# Patient Record
Sex: FEMALE | Race: WHITE | Employment: UNEMPLOYED | URBAN - METROPOLITAN AREA
[De-identification: names, ages, dates, MRNs, and addresses within clinical notes are randomized per-mention and may not be internally consistent; named-entity substitution may affect disease eponyms.]

---

## 2020-09-21 ENCOUNTER — HOSPITAL ENCOUNTER (EMERGENCY)
Age: 50
Discharge: HOME | End: 2020-09-21
Attending: EMERGENCY MEDICINE
Payer: COMMERCIAL

## 2020-09-21 VITALS
HEART RATE: 92 BPM | DIASTOLIC BLOOD PRESSURE: 88 MMHG | TEMPERATURE: 98.4 F | SYSTOLIC BLOOD PRESSURE: 177 MMHG | OXYGEN SATURATION: 98 % | HEIGHT: 59 IN | BODY MASS INDEX: 49.39 KG/M2 | WEIGHT: 245 LBS | RESPIRATION RATE: 22 BRPM

## 2020-09-21 PROCEDURE — 99282 EMERGENCY DEPT VISIT SF MDM: CPT

## 2020-09-21 PROCEDURE — 6360000002 HC RX W HCPCS: Performed by: EMERGENCY MEDICINE

## 2020-09-21 PROCEDURE — 96372 THER/PROPH/DIAG INJ SC/IM: CPT

## 2020-09-21 RX ORDER — MORPHINE SULFATE 4 MG/ML
4 INJECTION, SOLUTION INTRAMUSCULAR; INTRAVENOUS ONCE
Status: COMPLETED | OUTPATIENT
Start: 2020-09-21 | End: 2020-09-21

## 2020-09-21 RX ORDER — QUETIAPINE FUMARATE 50 MG/1
150 TABLET, FILM COATED ORAL DAILY
COMMUNITY

## 2020-09-21 RX ORDER — ESCITALOPRAM OXALATE 20 MG/1
1 TABLET ORAL DAILY
COMMUNITY

## 2020-09-21 RX ORDER — PROPRANOLOL HYDROCHLORIDE 10 MG/1
1 TABLET ORAL DAILY
Status: ON HOLD | COMMUNITY
End: 2021-03-24 | Stop reason: HOSPADM

## 2020-09-21 RX ORDER — ORPHENADRINE CITRATE 100 MG/1
100 TABLET, EXTENDED RELEASE ORAL 2 TIMES DAILY
Qty: 20 TABLET | Refills: 0 | Status: SHIPPED | OUTPATIENT
Start: 2020-09-21 | End: 2020-10-01

## 2020-09-21 RX ADMIN — MORPHINE SULFATE 4 MG: 4 INJECTION, SOLUTION INTRAMUSCULAR; INTRAVENOUS at 15:26

## 2020-09-21 ASSESSMENT — PAIN DESCRIPTION - LOCATION: LOCATION: BACK

## 2020-09-21 ASSESSMENT — PAIN SCALES - GENERAL
PAINLEVEL_OUTOF10: 9
PAINLEVEL_OUTOF10: 9

## 2020-09-21 ASSESSMENT — PAIN DESCRIPTION - ORIENTATION: ORIENTATION: LEFT

## 2020-09-21 NOTE — ED PROVIDER NOTES
[address] [facility]  [facility]      Pt Name: Marti Reynolds  MRN: [id number]  [place] 1970  Date ofevaluation: 2020  Provider: Anupama Mcclain MD    CHIEF COMPLAINT       Chief Complaint   Patient presents with    Back Pain       HPI    HISTORY OF PRESENT ILLNESS   (Location/Symptom, Timing/Onset,Context/Setting, Quality, Duration, Modifying Factors, Severity)  Note limiting factors. Marti Reynolds is a 52 y.o. female who presents to the emergency department with back pain. This is a 59-year-old female who states that she tripped walking up some steps several days ago injuring her back. She did not have any blunt injury to the back. The patient states this is consistent with her history of back pain. She denies any numbness or paresthesias. She points to her left lower lumbar area region. NursingNotes were reviewed. Review of Systems    REVIEW OF SYSTEMS    (2-9 systems for level 4, 10 or more for level 5)     Review of Systems   Constitutional: Negative for fever. HENT: Negative for rhinorrhea and sore throat. Eyes: Negative for redness. Respiratory: Negative for shortness of breath. Cardiovascular: Negative for chest pain. Gastrointestinal: Negative for abdominal pain. Genitourinary: Negative for flank pain. Neurological: Negative for headaches. Hematological: Negative for adenopathy. Psychiatric/Behavioral: Negative for confusion. Except as noted above the remainder of the review of systems was reviewed and negative.        PAST MEDICAL HISTORY     Past Medical History:   Diagnosis Date    Anxiety     COPD (chronic obstructive pulmonary disease) ([facility] [address]     Degenerative disk disease     Depression     High blood pressure     MRSA (methicillin resistant staph aureus) culture positive          SURGICALHISTORY       Past Surgical History:   Procedure Laterality Date     SECTION      FRACTURE SURGERY rods and plate in left foot    TUBAL LIGATION           CURRENT MEDICATIONS       Previous Medications    ESCITALOPRAM OXALATE (LEXAPRO PO)    Take by mouth    LIDOCAINE (LIDODERM) 5 %    Place 1 patch onto the skin daily 12 hours on, 12 hours off. PROPRANOLOL HCL PO    Take by mouth    QUETIAPINE FUMARATE (SEROQUEL PO)    Take by mouth       ALLERGIES     Cephalexin; Codeine; and Vancomycin    FAMILY HISTORY       Family History   Problem Relation Age of Onset    Cancer Father           SOCIAL HISTORY       Social History     Socioeconomic History    Marital status:       Spouse name: None    Number of children: None    Years of education: None    Highest education level: None   Occupational History    None   Social Needs    Financial resource strain: None    Food insecurity     Worry: None     Inability: None    Transportation needs     Medical: None     Non-medical: None   Tobacco Use    Smoking status: Current Every Day Smoker     Packs/day: 0.25     Types: Cigarettes    Smokeless tobacco: Never Used   Substance and Sexual Activity    Alcohol use: No    Drug use: No    Sexual activity: Yes     Partners: Male   Lifestyle    Physical activity     Days per week: None     Minutes per session: None    Stress: None   Relationships    Social connections     Talks on phone: None     Gets together: None     Attends Anabaptist service: None     Active member of club or organization: None     Attends meetings of clubs or organizations: None     Relationship status: None    Intimate partner violence     Fear of current or ex partner: None     Emotionally abused: None     Physically abused: None     Forced sexual activity: None   Other Topics Concern    None   Social History Narrative    None       SCREENINGS             PHYSICAL EXAM    (up to 7 for level 4, 8 or more for level 5)     ED Triage Vitals [09/21/20 1442]   BP Temp Temp Source Pulse Resp SpO2 Height Weight   (!) 177/88 98.4 °F (36.9 °C) Oral 92 22 98 % 4' 11\" (1.499 m) 245 lb (111.1 kg)       Physical Exam:      General Appearance:  Alert, cooperative, appears stated age. Head:  Normocephalic, without obvious abnormality, atraumatic. Eyes:  conjunctiva/corneas clear, EOM's intact. Sclera anicteric. ENT:  Mucous remains moist and pink   Neck: Supple, symmetrical, trachea midline, no adenopathy. No jugular venous distention. Lungs:    Clear to auscultation bilaterally   Chest Wall:     Heart:   Genitourinary:    Abdomen:      Extremities:  Unremarkable. No clubbing cyanosis or edema. Pulses:  Good throughout   Skin:  No rashes or lesions to exposed skin. Neurologic: Alert and oriented X 3. Negative leg raises bilaterally. The patient did have some pain in her left lower back sciatic area. She ambulated without difficulty. There is no numbness or paresthesias. DIAGNOSTIC RESULTS         RADIOLOGY:   Non-plain filmimages such as CT, Ultrasound and MRI are read by the radiologist. Plain radiographic images are visualized and preliminarily interpreted by the emergency physician with the below findings:    See below    Interpretation per the Radiologist below, if available at the time ofthis note: All incidental findings were discussed with the patient. No orders to display         ED BEDSIDE ULTRASOUND:   Performed by ED Physician - none    LABS:  Labs Reviewed - No data to display    All other labs were within normal range or not returned as of this dictation. EMERGENCY DEPARTMENT COURSE and DIFFERENTIAL DIAGNOSIS/MDM:   Vitals:    Vitals:    09/21/20 1442   BP: (!) 177/88   Pulse: 92   Resp: 22   Temp: 98.4 °F (36.9 °C)   TempSrc: Oral   SpO2: 98%   Weight: 245 lb (111.1 kg)   Height: 4' 11\" (1.499 m)           MDM    The patient has remained stable throughout her hospital course. This is consistent with her chronic back pain.   The patient was given IM dose of morphine here and discharged with some back pain instructions and Norflex and referral back to a primary care physician. She is to return if worse or for any other emergencies. REASSESSMENT              CONSULTS:  None    PROCEDURES:  Unless otherwise noted below, none     Procedures    FINAL IMPRESSION      1. Strain of lumbar region, initial encounter          DISPOSITION/PLAN   DISPOSITION Decision To Discharge 09/21/2020 03:22:50 PM      PATIENT REFERREDTO:  [facility]) Pre-Services  [phone]  Call in 1 week  As needed      DISCHARGEMEDICATIONS:  New Prescriptions    ORPHENADRINE (NORFLEX) 100 MG EXTENDED RELEASE TABLET    Take 1 tablet by mouth 2 times daily for 10 days     Controlled Substances Monitoring:     RX Monitoring 3/10/2017   Attestation The Prescription Monitoring Report for this patient was reviewed today. Periodic Controlled Substance Monitoring No signs of potential drug abuse or diversion identified.        (Please note that portions of this note were completed with a voice recognition program.  Efforts were made to edit the dictations but occasionally words are mis-transcribed.)    Barbara Morgan MD (electronically signed)  Attending Emergency Physician          Barbara Morgan MD  09/21/20 6028

## 2021-03-03 ENCOUNTER — APPOINTMENT (OUTPATIENT)
Dept: GENERAL RADIOLOGY | Age: 51
DRG: 190 | End: 2021-03-03
Payer: COMMERCIAL

## 2021-03-03 ENCOUNTER — HOSPITAL ENCOUNTER (INPATIENT)
Age: 51
LOS: 2 days | Discharge: HOME | DRG: 190 | End: 2021-03-05
Attending: EMERGENCY MEDICINE | Admitting: INTERNAL MEDICINE
Payer: COMMERCIAL

## 2021-03-03 DIAGNOSIS — M79.605 LEFT LEG PAIN: ICD-10-CM

## 2021-03-03 DIAGNOSIS — I21.4 NSTEMI (NON-ST ELEVATED MYOCARDIAL INFARCTION) (HCC): Primary | ICD-10-CM

## 2021-03-03 DIAGNOSIS — S46.912A LEFT SHOULDER STRAIN, INITIAL ENCOUNTER: ICD-10-CM

## 2021-03-03 PROBLEM — R06.09 DYSPNEA ON EXERTION: Status: ACTIVE | Noted: 2021-03-03

## 2021-03-03 PROBLEM — E66.01 MORBID (SEVERE) OBESITY DUE TO EXCESS CALORIES (HCC): Status: ACTIVE | Noted: 2021-03-03

## 2021-03-03 PROBLEM — Z82.49 FAMILY HISTORY OF HEART FAILURE: Status: ACTIVE | Noted: 2021-03-03

## 2021-03-03 LAB
A/G RATIO: 1.1 (ref 1.1–2.2)
ALBUMIN SERPL-MCNC: 3.7 G/DL (ref 3.4–5)
ALP BLD-CCNC: 94 U/L (ref 40–129)
ALT SERPL-CCNC: 25 U/L (ref 10–40)
ANION GAP SERPL CALCULATED.3IONS-SCNC: 9 MMOL/L (ref 3–16)
ANTI-XA UNFRAC HEPARIN: 0.09 IU/ML (ref 0.3–0.7)
APTT: 50.5 SEC (ref 24.2–36.2)
AST SERPL-CCNC: 27 U/L (ref 15–37)
BASOPHILS ABSOLUTE: 0.1 K/UL (ref 0–0.2)
BASOPHILS RELATIVE PERCENT: 1.3 %
BILIRUB SERPL-MCNC: <0.2 MG/DL (ref 0–1)
BUN BLDV-MCNC: 7 MG/DL (ref 7–20)
CALCIUM SERPL-MCNC: 9.1 MG/DL (ref 8.3–10.6)
CHLORIDE BLD-SCNC: 97 MMOL/L (ref 99–110)
CO2: 31 MMOL/L (ref 21–32)
CREAT SERPL-MCNC: 0.7 MG/DL (ref 0.6–1.1)
D DIMER: <200 NG/ML DDU (ref 0–229)
EKG ATRIAL RATE: 108 BPM
EKG DIAGNOSIS: NORMAL
EKG P AXIS: 26 DEGREES
EKG P-R INTERVAL: 156 MS
EKG Q-T INTERVAL: 348 MS
EKG QRS DURATION: 72 MS
EKG QTC CALCULATION (BAZETT): 466 MS
EKG R AXIS: 14 DEGREES
EKG T AXIS: 43 DEGREES
EKG VENTRICULAR RATE: 108 BPM
EOSINOPHILS ABSOLUTE: 0.2 K/UL (ref 0–0.6)
EOSINOPHILS RELATIVE PERCENT: 1.8 %
GFR AFRICAN AMERICAN: >60
GFR NON-AFRICAN AMERICAN: >60
GLOBULIN: 3.4 G/DL
GLUCOSE BLD-MCNC: 153 MG/DL (ref 70–99)
HCG QUALITATIVE: NEGATIVE
HCT VFR BLD CALC: 42.1 % (ref 36–48)
HCT VFR BLD CALC: 42.8 % (ref 36–48)
HEMOGLOBIN: 14 G/DL (ref 12–16)
HEMOGLOBIN: 14.1 G/DL (ref 12–16)
INR BLD: 1.03 (ref 0.86–1.14)
LYMPHOCYTES ABSOLUTE: 2.2 K/UL (ref 1–5.1)
LYMPHOCYTES RELATIVE PERCENT: 24.2 %
MCH RBC QN AUTO: 32 PG (ref 26–34)
MCH RBC QN AUTO: 32.2 PG (ref 26–34)
MCHC RBC AUTO-ENTMCNC: 33 G/DL (ref 31–36)
MCHC RBC AUTO-ENTMCNC: 33.2 G/DL (ref 31–36)
MCV RBC AUTO: 96.3 FL (ref 80–100)
MCV RBC AUTO: 97.5 FL (ref 80–100)
MONOCYTES ABSOLUTE: 0.4 K/UL (ref 0–1.3)
MONOCYTES RELATIVE PERCENT: 4.4 %
NEUTROPHILS ABSOLUTE: 6.2 K/UL (ref 1.7–7.7)
NEUTROPHILS RELATIVE PERCENT: 68.3 %
PDW BLD-RTO: 16 % (ref 12.4–15.4)
PDW BLD-RTO: 16.1 % (ref 12.4–15.4)
PLATELET # BLD: 216 K/UL (ref 135–450)
PLATELET # BLD: 225 K/UL (ref 135–450)
PLATELET SLIDE REVIEW: ADEQUATE
PMV BLD AUTO: 9.3 FL (ref 5–10.5)
PMV BLD AUTO: 9.7 FL (ref 5–10.5)
POTASSIUM REFLEX MAGNESIUM: 4.1 MMOL/L (ref 3.5–5.1)
PRO-BNP: 133 PG/ML (ref 0–124)
PROTHROMBIN TIME: 11.9 SEC (ref 10–13.2)
RBC # BLD: 4.37 M/UL (ref 4–5.2)
RBC # BLD: 4.39 M/UL (ref 4–5.2)
SODIUM BLD-SCNC: 137 MMOL/L (ref 136–145)
TOTAL PROTEIN: 7.1 G/DL (ref 6.4–8.2)
TROPONIN: 0.04 NG/ML
TROPONIN: <0.01 NG/ML
WBC # BLD: 10 K/UL (ref 4–11)
WBC # BLD: 9 K/UL (ref 4–11)

## 2021-03-03 PROCEDURE — 94640 AIRWAY INHALATION TREATMENT: CPT

## 2021-03-03 PROCEDURE — 85379 FIBRIN DEGRADATION QUANT: CPT

## 2021-03-03 PROCEDURE — 99284 EMERGENCY DEPT VISIT MOD MDM: CPT

## 2021-03-03 PROCEDURE — 6360000002 HC RX W HCPCS: Performed by: EMERGENCY MEDICINE

## 2021-03-03 PROCEDURE — 83880 ASSAY OF NATRIURETIC PEPTIDE: CPT

## 2021-03-03 PROCEDURE — 73030 X-RAY EXAM OF SHOULDER: CPT

## 2021-03-03 PROCEDURE — 2580000003 HC RX 258: Performed by: INTERNAL MEDICINE

## 2021-03-03 PROCEDURE — 94761 N-INVAS EAR/PLS OXIMETRY MLT: CPT

## 2021-03-03 PROCEDURE — 2060000000 HC ICU INTERMEDIATE R&B

## 2021-03-03 PROCEDURE — 36415 COLL VENOUS BLD VENIPUNCTURE: CPT

## 2021-03-03 PROCEDURE — 94664 DEMO&/EVAL PT USE INHALER: CPT

## 2021-03-03 PROCEDURE — 85520 HEPARIN ASSAY: CPT

## 2021-03-03 PROCEDURE — 94150 VITAL CAPACITY TEST: CPT

## 2021-03-03 PROCEDURE — 96375 TX/PRO/DX INJ NEW DRUG ADDON: CPT

## 2021-03-03 PROCEDURE — 6370000000 HC RX 637 (ALT 250 FOR IP): Performed by: INTERNAL MEDICINE

## 2021-03-03 PROCEDURE — 2700000000 HC OXYGEN THERAPY PER DAY

## 2021-03-03 PROCEDURE — 96374 THER/PROPH/DIAG INJ IV PUSH: CPT

## 2021-03-03 PROCEDURE — 1200000000 HC SEMI PRIVATE

## 2021-03-03 PROCEDURE — 85027 COMPLETE CBC AUTOMATED: CPT

## 2021-03-03 PROCEDURE — 6370000000 HC RX 637 (ALT 250 FOR IP): Performed by: EMERGENCY MEDICINE

## 2021-03-03 PROCEDURE — 85025 COMPLETE CBC W/AUTO DIFF WBC: CPT

## 2021-03-03 PROCEDURE — 84703 CHORIONIC GONADOTROPIN ASSAY: CPT

## 2021-03-03 PROCEDURE — 6360000002 HC RX W HCPCS: Performed by: INTERNAL MEDICINE

## 2021-03-03 PROCEDURE — 85610 PROTHROMBIN TIME: CPT

## 2021-03-03 PROCEDURE — 93005 ELECTROCARDIOGRAM TRACING: CPT | Performed by: EMERGENCY MEDICINE

## 2021-03-03 PROCEDURE — 84484 ASSAY OF TROPONIN QUANT: CPT

## 2021-03-03 PROCEDURE — 85730 THROMBOPLASTIN TIME PARTIAL: CPT

## 2021-03-03 PROCEDURE — 71046 X-RAY EXAM CHEST 2 VIEWS: CPT

## 2021-03-03 PROCEDURE — 93010 ELECTROCARDIOGRAM REPORT: CPT | Performed by: INTERNAL MEDICINE

## 2021-03-03 PROCEDURE — 80053 COMPREHEN METABOLIC PANEL: CPT

## 2021-03-03 RX ORDER — SODIUM CHLORIDE 0.9 % (FLUSH) 0.9 %
10 SYRINGE (ML) INJECTION EVERY 12 HOURS SCHEDULED
Status: DISCONTINUED | OUTPATIENT
Start: 2021-03-03 | End: 2021-03-05 | Stop reason: HOSPADM

## 2021-03-03 RX ORDER — PROMETHAZINE HYDROCHLORIDE 12.5 MG/1
12.5 TABLET ORAL EVERY 6 HOURS PRN
Status: DISCONTINUED | OUTPATIENT
Start: 2021-03-03 | End: 2021-03-05 | Stop reason: HOSPADM

## 2021-03-03 RX ORDER — IPRATROPIUM BROMIDE AND ALBUTEROL SULFATE 2.5; .5 MG/3ML; MG/3ML
1 SOLUTION RESPIRATORY (INHALATION) EVERY 4 HOURS
COMMUNITY

## 2021-03-03 RX ORDER — IBUPROFEN 800 MG/1
800 TABLET ORAL PRN
Status: ON HOLD | COMMUNITY
End: 2021-03-24 | Stop reason: HOSPADM

## 2021-03-03 RX ORDER — QUETIAPINE FUMARATE 300 MG/1
150 TABLET, FILM COATED ORAL DAILY
Status: DISCONTINUED | OUTPATIENT
Start: 2021-03-04 | End: 2021-03-05 | Stop reason: HOSPADM

## 2021-03-03 RX ORDER — HEPARIN SODIUM 1000 [USP'U]/ML
4000 INJECTION, SOLUTION INTRAVENOUS; SUBCUTANEOUS PRN
Status: DISCONTINUED | OUTPATIENT
Start: 2021-03-03 | End: 2021-03-04

## 2021-03-03 RX ORDER — HEPARIN SODIUM 1000 [USP'U]/ML
2000 INJECTION, SOLUTION INTRAVENOUS; SUBCUTANEOUS PRN
Status: DISCONTINUED | OUTPATIENT
Start: 2021-03-03 | End: 2021-03-03 | Stop reason: SDUPTHER

## 2021-03-03 RX ORDER — ASPIRIN 81 MG/1
81 TABLET, CHEWABLE ORAL DAILY
Status: DISCONTINUED | OUTPATIENT
Start: 2021-03-04 | End: 2021-03-05 | Stop reason: HOSPADM

## 2021-03-03 RX ORDER — HEPARIN SODIUM 1000 [USP'U]/ML
4000 INJECTION, SOLUTION INTRAVENOUS; SUBCUTANEOUS ONCE
Status: COMPLETED | OUTPATIENT
Start: 2021-03-03 | End: 2021-03-03

## 2021-03-03 RX ORDER — ESCITALOPRAM OXALATE 20 MG/1
20 TABLET ORAL DAILY
Status: DISCONTINUED | OUTPATIENT
Start: 2021-03-04 | End: 2021-03-05 | Stop reason: HOSPADM

## 2021-03-03 RX ORDER — ACETAMINOPHEN 325 MG/1
650 TABLET ORAL EVERY 6 HOURS PRN
Status: DISCONTINUED | OUTPATIENT
Start: 2021-03-03 | End: 2021-03-05 | Stop reason: HOSPADM

## 2021-03-03 RX ORDER — ATORVASTATIN CALCIUM 80 MG/1
80 TABLET, FILM COATED ORAL NIGHTLY
Status: DISCONTINUED | OUTPATIENT
Start: 2021-03-03 | End: 2021-03-05 | Stop reason: HOSPADM

## 2021-03-03 RX ORDER — ORPHENADRINE CITRATE 30 MG/ML
60 INJECTION INTRAMUSCULAR; INTRAVENOUS ONCE
Status: COMPLETED | OUTPATIENT
Start: 2021-03-03 | End: 2021-03-03

## 2021-03-03 RX ORDER — OXYCODONE HYDROCHLORIDE AND ACETAMINOPHEN 5; 325 MG/1; MG/1
1 TABLET ORAL ONCE
Status: COMPLETED | OUTPATIENT
Start: 2021-03-03 | End: 2021-03-03

## 2021-03-03 RX ORDER — IPRATROPIUM BROMIDE AND ALBUTEROL SULFATE 2.5; .5 MG/3ML; MG/3ML
1 SOLUTION RESPIRATORY (INHALATION) EVERY 4 HOURS
Status: DISCONTINUED | OUTPATIENT
Start: 2021-03-03 | End: 2021-03-03

## 2021-03-03 RX ORDER — HEPARIN SODIUM 10000 [USP'U]/100ML
11 INJECTION, SOLUTION INTRAVENOUS CONTINUOUS
Status: DISCONTINUED | OUTPATIENT
Start: 2021-03-03 | End: 2021-03-04

## 2021-03-03 RX ORDER — TRAMADOL HYDROCHLORIDE 50 MG/1
50 TABLET ORAL EVERY 6 HOURS PRN
Status: DISCONTINUED | OUTPATIENT
Start: 2021-03-03 | End: 2021-03-05 | Stop reason: HOSPADM

## 2021-03-03 RX ORDER — POLYETHYLENE GLYCOL 3350 17 G/17G
17 POWDER, FOR SOLUTION ORAL DAILY PRN
Status: DISCONTINUED | OUTPATIENT
Start: 2021-03-03 | End: 2021-03-05 | Stop reason: HOSPADM

## 2021-03-03 RX ORDER — ONDANSETRON 2 MG/ML
4 INJECTION INTRAMUSCULAR; INTRAVENOUS ONCE
Status: COMPLETED | OUTPATIENT
Start: 2021-03-03 | End: 2021-03-03

## 2021-03-03 RX ORDER — HEPARIN SODIUM 1000 [USP'U]/ML
4000 INJECTION, SOLUTION INTRAVENOUS; SUBCUTANEOUS PRN
Status: DISCONTINUED | OUTPATIENT
Start: 2021-03-03 | End: 2021-03-03 | Stop reason: SDUPTHER

## 2021-03-03 RX ORDER — ASPIRIN 81 MG/1
324 TABLET, CHEWABLE ORAL ONCE
Status: COMPLETED | OUTPATIENT
Start: 2021-03-03 | End: 2021-03-03

## 2021-03-03 RX ORDER — ACETAMINOPHEN 650 MG/1
650 SUPPOSITORY RECTAL EVERY 6 HOURS PRN
Status: DISCONTINUED | OUTPATIENT
Start: 2021-03-03 | End: 2021-03-05 | Stop reason: HOSPADM

## 2021-03-03 RX ORDER — ONDANSETRON 2 MG/ML
4 INJECTION INTRAMUSCULAR; INTRAVENOUS EVERY 6 HOURS PRN
Status: DISCONTINUED | OUTPATIENT
Start: 2021-03-03 | End: 2021-03-05 | Stop reason: HOSPADM

## 2021-03-03 RX ORDER — ALBUTEROL SULFATE 2.5 MG/3ML
2.5 SOLUTION RESPIRATORY (INHALATION)
Status: DISCONTINUED | OUTPATIENT
Start: 2021-03-04 | End: 2021-03-05 | Stop reason: HOSPADM

## 2021-03-03 RX ORDER — HEPARIN SODIUM 10000 [USP'U]/100ML
7 INJECTION, SOLUTION INTRAVENOUS CONTINUOUS
Status: DISCONTINUED | OUTPATIENT
Start: 2021-03-03 | End: 2021-03-03 | Stop reason: SDUPTHER

## 2021-03-03 RX ORDER — KETOROLAC TROMETHAMINE 30 MG/ML
30 INJECTION, SOLUTION INTRAMUSCULAR; INTRAVENOUS ONCE
Status: COMPLETED | OUTPATIENT
Start: 2021-03-03 | End: 2021-03-03

## 2021-03-03 RX ORDER — NICOTINE 21 MG/24HR
1 PATCH, TRANSDERMAL 24 HOURS TRANSDERMAL DAILY
Status: DISCONTINUED | OUTPATIENT
Start: 2021-03-04 | End: 2021-03-03

## 2021-03-03 RX ORDER — ALBUTEROL SULFATE 2.5 MG/3ML
2.5 SOLUTION RESPIRATORY (INHALATION) EVERY 6 HOURS PRN
Status: DISCONTINUED | OUTPATIENT
Start: 2021-03-03 | End: 2021-03-05 | Stop reason: HOSPADM

## 2021-03-03 RX ORDER — SODIUM CHLORIDE 0.9 % (FLUSH) 0.9 %
10 SYRINGE (ML) INJECTION PRN
Status: DISCONTINUED | OUTPATIENT
Start: 2021-03-03 | End: 2021-03-05 | Stop reason: HOSPADM

## 2021-03-03 RX ORDER — IPRATROPIUM BROMIDE AND ALBUTEROL SULFATE 2.5; .5 MG/3ML; MG/3ML
1 SOLUTION RESPIRATORY (INHALATION) EVERY 4 HOURS PRN
Status: DISCONTINUED | OUTPATIENT
Start: 2021-03-03 | End: 2021-03-05 | Stop reason: HOSPADM

## 2021-03-03 RX ORDER — HEPARIN SODIUM 1000 [USP'U]/ML
2000 INJECTION, SOLUTION INTRAVENOUS; SUBCUTANEOUS PRN
Status: DISCONTINUED | OUTPATIENT
Start: 2021-03-03 | End: 2021-03-04

## 2021-03-03 RX ORDER — NICOTINE 21 MG/24HR
1 PATCH, TRANSDERMAL 24 HOURS TRANSDERMAL DAILY
Status: DISCONTINUED | OUTPATIENT
Start: 2021-03-03 | End: 2021-03-05 | Stop reason: HOSPADM

## 2021-03-03 RX ADMIN — ONDANSETRON 4 MG: 2 INJECTION INTRAMUSCULAR; INTRAVENOUS at 17:12

## 2021-03-03 RX ADMIN — KETOROLAC TROMETHAMINE 30 MG: 30 INJECTION, SOLUTION INTRAMUSCULAR at 14:32

## 2021-03-03 RX ADMIN — HEPARIN SODIUM 7 UNITS/KG/HR: 10000 INJECTION, SOLUTION INTRAVENOUS at 16:41

## 2021-03-03 RX ADMIN — Medication 10 ML: at 20:38

## 2021-03-03 RX ADMIN — NICOTINE POLACRILEX 2 MG: 2 GUM, CHEWING BUCCAL at 17:13

## 2021-03-03 RX ADMIN — NITROGLYCERIN 0.5 INCH: 20 OINTMENT TOPICAL at 15:40

## 2021-03-03 RX ADMIN — NITROGLYCERIN 0.5 INCH: 20 OINTMENT TOPICAL at 23:48

## 2021-03-03 RX ADMIN — HEPARIN SODIUM 7 UNITS/KG/HR: 10000 INJECTION, SOLUTION INTRAVENOUS at 20:36

## 2021-03-03 RX ADMIN — ORPHENADRINE CITRATE 60 MG: 30 INJECTION INTRAMUSCULAR; INTRAVENOUS at 14:49

## 2021-03-03 RX ADMIN — TRAMADOL HYDROCHLORIDE 50 MG: 50 TABLET, FILM COATED ORAL at 22:25

## 2021-03-03 RX ADMIN — HEPARIN SODIUM 4000 UNITS: 1000 INJECTION, SOLUTION INTRAVENOUS; SUBCUTANEOUS at 23:50

## 2021-03-03 RX ADMIN — ATORVASTATIN CALCIUM 80 MG: 80 TABLET, FILM COATED ORAL at 20:36

## 2021-03-03 RX ADMIN — NITROGLYCERIN 0.5 INCH: 20 OINTMENT TOPICAL at 20:36

## 2021-03-03 RX ADMIN — IPRATROPIUM BROMIDE AND ALBUTEROL SULFATE 3 ML: .5; 3 SOLUTION RESPIRATORY (INHALATION) at 22:09

## 2021-03-03 RX ADMIN — OXYCODONE HYDROCHLORIDE AND ACETAMINOPHEN 1 TABLET: 5; 325 TABLET ORAL at 17:12

## 2021-03-03 RX ADMIN — HEPARIN SODIUM 4000 UNITS: 1000 INJECTION INTRAVENOUS; SUBCUTANEOUS at 16:41

## 2021-03-03 RX ADMIN — ASPIRIN 324 MG: 81 TABLET, CHEWABLE ORAL at 15:40

## 2021-03-03 ASSESSMENT — PAIN DESCRIPTION - ONSET
ONSET: ON-GOING
ONSET: ON-GOING

## 2021-03-03 ASSESSMENT — PAIN DESCRIPTION - DESCRIPTORS: DESCRIPTORS: ACHING

## 2021-03-03 ASSESSMENT — PAIN DESCRIPTION - LOCATION
LOCATION: ARM
LOCATION: ARM

## 2021-03-03 ASSESSMENT — PAIN DESCRIPTION - PROGRESSION: CLINICAL_PROGRESSION: NOT CHANGED

## 2021-03-03 ASSESSMENT — PAIN DESCRIPTION - ORIENTATION: ORIENTATION: LEFT

## 2021-03-03 ASSESSMENT — PAIN SCALES - GENERAL
PAINLEVEL_OUTOF10: 8
PAINLEVEL_OUTOF10: 6

## 2021-03-03 ASSESSMENT — PAIN DESCRIPTION - FREQUENCY: FREQUENCY: CONTINUOUS

## 2021-03-03 NOTE — ED NOTES
Report to [employer]. Patient alert and oriented at time of transport. Agreeable to admission. Taken on cardiac monitor with heparin infusing on IV pump.      Dea Nava RN  03/03/21 1346

## 2021-03-03 NOTE — ED PROVIDER NOTES
[facility] [place], [place]  EMERGENCY DEPT VISIT      Patient Identification  Lashawn Sutton is a 52 y.o. female. Chief Complaint   Shortness of Breath and Arm Pain      History of Present Illness: This is a  52 y.o. female who presents ambulatory  to the ED with complaints of left arm and shoulder pain and left leg pain. Patient states that she had a fall 3 or 4 weeks ago. At the time of the fall she primarily states that her left foot hurt but was still able to walk on it. About a week later she started having pain in her left shoulder and pectoral region which radiates down into the elbow and forearm with movement of the arm. This is gotten progressively worse to the point where she cannot even pull up her pants with this arm. She has been taking ibuprofen and a few Robaxin tablets without relief. She also states that her left leg has been painful for the last 3 weeks again starting several days after the fall. Most of her pain is in the back of the knee and calf. She has noticed increased swelling in left greater than right leg. She states that she does live a relatively sedentary lifestyle however she has been up and ambulating on the leg ever since the fall. She has no history of DVT or PE in the past.  Patient does have history of COPD and states that her shortness of breath has been getting progressively worse over several months. She has put on a fair amount of weight as well. She saw her PCP 2 months ago and was started on Spiriva but has noticed only minimal improvement. She was noted to be quite short of breath when she arrived in the emergency department but she tells me that this is fairly typical for her when she walks any significant distance. She denies any fever. No sinus congestion, sore throat, cough. She will occasionally get some tightness in her chest. IT lasts 15-60 minutes and has been going on for a few months. It may be getting more prolonged. Last episode earlier today.  She is not syncope  SKIN: no rashes, no erythema, no wounds, no ecchymosis      PHYSICAL EXAM:  GENERAL APPEARANCE: Arcadio Martin is in no acute respiratory distress. Awake and alert. VITAL SIGNS:   ED Triage Vitals [03/03/21 1336]   Enc Vitals Group      BP (!) 174/84      Pulse 118      Resp 18      Temp 98.5 °F (36.9 °C)      Temp Source Oral      SpO2 95 %      Weight (!) 303 lb 12.8 oz (137.8 kg)      Height       Head Circumference       Peak Flow       Pain Score       Pain Loc       Pain Edu? Excl. in [address]? HEAD: Normocephalic, atraumatic. EYES:  Extraocular muscles are intact. Pupils equal round and reactive to light. Conjunctivas are pink. Negative scleral icterus. ENT:  Mucous membranes are moist.  Pharynx without erythema or exudates. NECK: Nontender and supple. No cervical adenopathy. CHEST:  Clear to auscultation bilaterally. No rales, rhonchi, or wheezing. Diminished breath sounds  HEART:  Mildly tachycardic rate and regular rhythm. No murmurs. Strong and equal pulses in the upper and lower extremities. ABDOMEN: Soft,  nondistended, positive bowel sounds. abdomen is nontender. No rebound. no guarding. MUSCULOSKELETAL: The calves are nontender to palpation. Active range of motion of the upper and lower extremities. No edema. Left shoulder is diffuselly tender with decreased ROM due to pain. Left pectoral and posterior shoulder tender. Left calf and popliteal region tender. No bruising or erythema. Strong pedal pulses. NEUROLOGICAL: Awake, alert and oriented x 3. Power intact in the upper and lower extremities. Sensation is intact to light touch in the upper and lower extremities. Cranial Nerves 2-12 are intact. DERMATOLOGIC: No petechiae, rashes, or ecchymoses. No erythema. PSYCH: normal mood and affect. Normal thought content.       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 Procedure: 2D Echo with Doppler and color flow (36563). The exam was  diagnostic. Left Ventricle: The left ventricle is normal in size. There is normal  left ventricular wall thickness. The left ventricular wall motion is  normal. No left ventricular thrombus detected. Right Ventricle: The right ventricle is normal size. There is normal  right ventricular wall thickness. The right ventricular systolic  function is normal.    Left Atrium: The left atrial size is normal.    Right Atrium: Right atrial size is normal.    Mitral Valve: The mitral valve leaflets appear normal. There is no  evidence of stenosis, fluttering, or prolapse. There is trace mitral  regurgitation. There is no evidence of mitral valve prolapse. There  is no mitral valve stenosis. Aortic Valve: The aortic valve is normal in structure. No aortic  regurgitation is present. No hemodynamically significant valvular  aortic stenosis. Aortic Root: The aortic root is normal size. Tricuspid Valve: The tricuspid valve is normal in structure and  function. Trace tricuspid regurgitation is present. Diastolic Function: Normal Diastolic Function. Pulmonic Valve/Pulmonary Artery: The pulmonic valve is normal in  structure. There is trace pulmonic valvular regurgitation. Pericardium: There is no pericardial effusion. There is no pleural  effusion. Signed by:Yanick Cee MD on 03/08/2019 12:54 PM  Referring Physician: Lexy Hannah Performed By: Liv Pereira RDCS      EKG as interpreted by myself:  sinus tachycardia, jxhg=[id number]   Axis is   Normal  QTc is  normal  Intervals and Durations are unremarkable. LOW VOLTAGE  Septal Q waves    No specific ST-T wave changes appreciated. No evidence of acute ischemia. No prior EKG    Radiology:  Films have been read by radiologist as noted in chart unless otherwise stated.  Other radiologic studies (i.e. CT, MRI, ultrasounds, etc ) have been interpreted by radiologist.     XR CHEST (2 VW)   Final Result      CHEST: 2 views demonstrate clear lungs. Normal cardiac mediastinal silhouette. LEFT SHOULDER: 3 views demonstrate no abnormality. XR SHOULDER LEFT (MIN 2 VIEWS)   Final Result      CHEST: 2 views demonstrate clear lungs. Normal cardiac mediastinal silhouette. LEFT SHOULDER: 3 views demonstrate no abnormality.           Labs:  Results for orders placed or performed during the hospital encounter of 03/03/21   CBC Auto Differential   Result Value Ref Range    WBC 9.0 4.0 - 11.0 K/uL    RBC 4.37 4.00 - 5.20 M/uL    Hemoglobin 14.0 12.0 - 16.0 g/dL    Hematocrit 42.1 36.0 - 48.0 %    MCV 96.3 80.0 - 100.0 fL    MCH 32.0 26.0 - 34.0 pg    MCHC 33.2 31.0 - 36.0 g/dL    RDW 16.1 (H) 12.4 - 15.4 %    Platelets 133 130 - 234 K/uL    MPV 9.7 5.0 - 10.5 fL    PLATELET SLIDE REVIEW Adequate     Neutrophils % 68.3 %    Lymphocytes % 24.2 %    Monocytes % 4.4 %    Eosinophils % 1.8 %    Basophils % 1.3 %    Neutrophils Absolute 6.2 1.7 - 7.7 K/uL    Lymphocytes Absolute 2.2 1.0 - 5.1 K/uL    Monocytes Absolute 0.4 0.0 - 1.3 K/uL    Eosinophils Absolute 0.2 0.0 - 0.6 K/uL    Basophils Absolute 0.1 0.0 - 0.2 K/uL   Comprehensive Metabolic Panel w/ Reflex to MG   Result Value Ref Range    Sodium 137 136 - 145 mmol/L    Potassium reflex Magnesium 4.1 3.5 - 5.1 mmol/L    Chloride 97 (L) 99 - 110 mmol/L    CO2 31 21 - 32 mmol/L    Anion Gap 9 3 - 16    Glucose 153 (H) 70 - 99 mg/dL    BUN 7 7 - 20 mg/dL    CREATININE 0.7 0.6 - 1.1 mg/dL    GFR Non-African American >60 >60    GFR African American >60 >60    Calcium 9.1 8.3 - 10.6 mg/dL    Total Protein 7.1 6.4 - 8.2 g/dL    Albumin 3.7 3.4 - 5.0 g/dL    Albumin/Globulin Ratio 1.1 1.1 - 2.2    Total Bilirubin <0.2 0.0 - 1.0 mg/dL    Alkaline Phosphatase 94 40 - 129 U/L    ALT 25 10 - 40 U/L    AST 27 15 - 37 U/L    Globulin 3.4 g/dL   D-Dimer, Quantitative   Result Value Ref Range    D-Dimer, Quant <200 0 - 229 ng/mL DDU   HCG Qualitative, Serum   Result Value Ref Range    hCG Qual Negative Detects HCG level >10 MIU/mL   Troponin   Result Value Ref Range    Troponin 0.04 (H) <0.01 ng/mL   Brain Natriuretic Peptide   Result Value Ref Range    Pro- (H) 0 - 124 pg/mL   Troponin   Result Value Ref Range    Troponin <0.01 <0.01 ng/mL   EKG 12 Lead   Result Value Ref Range    Ventricular Rate 108 BPM    Atrial Rate 108 BPM    P-R Interval 156 ms    QRS Duration 72 ms    Q-T Interval 348 ms    QTc Calculation (Bazett) 466 ms    P Axis 26 degrees    R Axis 14 degrees    T Axis 43 degrees    Diagnosis       Sinus tachycardiaAnteroseptal infarct , age undeterminedAbnormal ECGConfirmed by [facility] ANUJ MAGALLANES, Vel Huff ([phone]) on 3/3/2021 3:24:41 PM       Treatment in the department:  Patient received the following while in the ED.     Medications   heparin (porcine) injection 4,000 Units (has no administration in time range)   heparin (porcine) injection 2,000 Units (has no administration in time range)   heparin 25,000 units in dextrose 5% 250 mL (premix) infusion (7 Units/kg/hr × 137.8 kg Intravenous New Bag 3/3/21 1641)   nicotine polacrilex (NICORETTE) gum 2 mg (2 mg Oral Given 3/3/21 1713)   ketorolac (TORADOL) injection 30 mg (30 mg Intravenous Given 3/3/21 1432)   orphenadrine (NORFLEX) injection 60 mg (60 mg Intravenous Given 3/3/21 1449)   aspirin chewable tablet 324 mg (324 mg Oral Given 3/3/21 1540)   nitroglycerin (NITRO-BID) 2 % ointment 0.5 inch (0.5 inches Topical Given 3/3/21 1540)   heparin (porcine) injection 4,000 Units (4,000 Units Intravenous Given 3/3/21 1641)   oxyCODONE-acetaminophen (PERCOCET) 5-325 MG per tablet 1 tablet (1 tablet Oral Given 3/3/21 1712)   ondansetron (ZOFRAN) injection 4 mg (4 mg Intravenous Given 3/3/21 1712)       Medical decision making:  Patient presented with primary complaint of left shoulder pain and left leg pain but noted to be quite dyspneic on arrival. Later reported off and on chest tightness for months with this somewhat progressive sob. Leg and shoulder pain after fall. No fractures. ddimer negative so less likely to have DVT. Troponin positive but repeat normal. Has family h/o cardiomyopathy. 1620  Discussed case with dr Bess Johnson, cardiology. Agrees with admission and IV heparin    I spoke with Dr. Urvashi Glaser. We thoroughly discussed the history, physical exam, laboratory and imaging studies, as well as, emergency department course. Based upon that discussion, we've decided to [address] [place] [address] for further observation and evaluation ofNanette Guerrero's chest pain. As I have deemed necessary from their history, physical, and studies, I have considered and evaluatedNanette Guerrero for the following diagnoses:  ACUTE CORONARY SYNDROME, PERICARDIAL TAMPONADE, PNEUMOTHORAX, PULMONARY EMBOLISM, and THORACIC DISSECTION, PNEUMONIA, PERICARDITIS. Clinical Impression:  1. NSTEMI (non-ST elevated myocardial infarction) ([address]    2. Left shoulder strain, initial encounter    3. Left leg pain        Dispo:  Patient will be admitted at this time. Patient was informed of this decision and agrees with plan. I have discussed lab and xray findings with patient and they understand. Questions were answered to the best of my ability. Discharge vitals:  Blood pressure (!) 147/75, pulse 95, temperature 98.5 °F (36.9 °C), temperature source Oral, resp. rate 18, weight (!) 303 lb 12.8 oz (137.8 kg), last menstrual period 02/28/2021, SpO2 93 %. Prescriptions given:   New Prescriptions    No medications on file           This chart was created using Dragon voice recognition software.         Joyce Grace MD  03/05/21 5743

## 2021-03-03 NOTE — ED TRIAGE NOTES
Patient c/o shortness of breath since yesterday. Swelling to BLE x past 3 weeks. Also c/o left arm pain x 3 weeks which she believes is from a fall 4 weeks ago. Painful to move arm.

## 2021-03-04 ENCOUNTER — APPOINTMENT (OUTPATIENT)
Dept: GENERAL RADIOLOGY | Age: 51
DRG: 190 | End: 2021-03-04
Payer: COMMERCIAL

## 2021-03-04 LAB
ANION GAP SERPL CALCULATED.3IONS-SCNC: 7 MMOL/L (ref 3–16)
ANTI-XA UNFRAC HEPARIN: 0.29 IU/ML (ref 0.3–0.7)
ANTI-XA UNFRAC HEPARIN: 0.32 IU/ML (ref 0.3–0.7)
BUN BLDV-MCNC: 9 MG/DL (ref 7–20)
CALCIUM SERPL-MCNC: 8.8 MG/DL (ref 8.3–10.6)
CHLORIDE BLD-SCNC: 99 MMOL/L (ref 99–110)
CHOLESTEROL, TOTAL: 196 MG/DL (ref 0–199)
CO2: 30 MMOL/L (ref 21–32)
CREAT SERPL-MCNC: 0.8 MG/DL (ref 0.6–1.1)
EKG ATRIAL RATE: 94 BPM
EKG DIAGNOSIS: NORMAL
EKG P AXIS: 52 DEGREES
EKG P-R INTERVAL: 156 MS
EKG Q-T INTERVAL: 380 MS
EKG QRS DURATION: 72 MS
EKG QTC CALCULATION (BAZETT): 475 MS
EKG R AXIS: 28 DEGREES
EKG T AXIS: 63 DEGREES
EKG VENTRICULAR RATE: 94 BPM
GFR AFRICAN AMERICAN: >60
GFR NON-AFRICAN AMERICAN: >60
GLUCOSE BLD-MCNC: 148 MG/DL (ref 70–99)
HCT VFR BLD CALC: 40.5 % (ref 36–48)
HDLC SERPL-MCNC: 34 MG/DL (ref 40–60)
HEMOGLOBIN: 13.6 G/DL (ref 12–16)
LDL CHOLESTEROL CALCULATED: 109 MG/DL
LV EF: 58 %
LVEF MODALITY: NORMAL
MCH RBC QN AUTO: 32.3 PG (ref 26–34)
MCHC RBC AUTO-ENTMCNC: 33.5 G/DL (ref 31–36)
MCV RBC AUTO: 96.2 FL (ref 80–100)
PDW BLD-RTO: 16.3 % (ref 12.4–15.4)
PLATELET # BLD: 187 K/UL (ref 135–450)
PMV BLD AUTO: 9.8 FL (ref 5–10.5)
POTASSIUM REFLEX MAGNESIUM: 4.9 MMOL/L (ref 3.5–5.1)
RBC # BLD: 4.21 M/UL (ref 4–5.2)
SODIUM BLD-SCNC: 136 MMOL/L (ref 136–145)
TRIGL SERPL-MCNC: 263 MG/DL (ref 0–150)
VLDLC SERPL CALC-MCNC: 53 MG/DL
WBC # BLD: 7.9 K/UL (ref 4–11)

## 2021-03-04 PROCEDURE — 2580000003 HC RX 258: Performed by: INTERNAL MEDICINE

## 2021-03-04 PROCEDURE — 94761 N-INVAS EAR/PLS OXIMETRY MLT: CPT

## 2021-03-04 PROCEDURE — 99255 IP/OBS CONSLTJ NEW/EST HI 80: CPT | Performed by: INTERNAL MEDICINE

## 2021-03-04 PROCEDURE — 6370000000 HC RX 637 (ALT 250 FOR IP): Performed by: INTERNAL MEDICINE

## 2021-03-04 PROCEDURE — 94640 AIRWAY INHALATION TREATMENT: CPT

## 2021-03-04 PROCEDURE — 71046 X-RAY EXAM CHEST 2 VIEWS: CPT

## 2021-03-04 PROCEDURE — 6360000002 HC RX W HCPCS: Performed by: INTERNAL MEDICINE

## 2021-03-04 PROCEDURE — 85520 HEPARIN ASSAY: CPT

## 2021-03-04 PROCEDURE — 93005 ELECTROCARDIOGRAM TRACING: CPT | Performed by: INTERNAL MEDICINE

## 2021-03-04 PROCEDURE — 2060000000 HC ICU INTERMEDIATE R&B

## 2021-03-04 PROCEDURE — 2700000000 HC OXYGEN THERAPY PER DAY

## 2021-03-04 PROCEDURE — 80061 LIPID PANEL: CPT

## 2021-03-04 PROCEDURE — 83036 HEMOGLOBIN GLYCOSYLATED A1C: CPT

## 2021-03-04 PROCEDURE — 36415 COLL VENOUS BLD VENIPUNCTURE: CPT

## 2021-03-04 PROCEDURE — 93010 ELECTROCARDIOGRAM REPORT: CPT | Performed by: INTERNAL MEDICINE

## 2021-03-04 PROCEDURE — 80048 BASIC METABOLIC PNL TOTAL CA: CPT

## 2021-03-04 PROCEDURE — 93306 TTE W/DOPPLER COMPLETE: CPT

## 2021-03-04 PROCEDURE — 85027 COMPLETE CBC AUTOMATED: CPT

## 2021-03-04 RX ORDER — LISINOPRIL 5 MG/1
5 TABLET ORAL DAILY
Status: DISCONTINUED | OUTPATIENT
Start: 2021-03-04 | End: 2021-03-05 | Stop reason: HOSPADM

## 2021-03-04 RX ORDER — FUROSEMIDE 10 MG/ML
20 INJECTION INTRAMUSCULAR; INTRAVENOUS ONCE
Status: COMPLETED | OUTPATIENT
Start: 2021-03-04 | End: 2021-03-04

## 2021-03-04 RX ADMIN — FUROSEMIDE 20 MG: 10 INJECTION, SOLUTION INTRAMUSCULAR; INTRAVENOUS at 18:24

## 2021-03-04 RX ADMIN — ESCITALOPRAM OXALATE 20 MG: 20 TABLET ORAL at 08:08

## 2021-03-04 RX ADMIN — QUETIAPINE FUMARATE 150 MG: 300 TABLET ORAL at 08:08

## 2021-03-04 RX ADMIN — NITROGLYCERIN 0.5 INCH: 20 OINTMENT TOPICAL at 05:53

## 2021-03-04 RX ADMIN — ALBUTEROL SULFATE 2.5 MG: 2.5 SOLUTION RESPIRATORY (INHALATION) at 12:44

## 2021-03-04 RX ADMIN — DICLOFENAC 4 G: 10 GEL TOPICAL at 04:20

## 2021-03-04 RX ADMIN — ALBUTEROL SULFATE 2.5 MG: 2.5 SOLUTION RESPIRATORY (INHALATION) at 09:39

## 2021-03-04 RX ADMIN — LISINOPRIL 5 MG: 5 TABLET ORAL at 16:01

## 2021-03-04 RX ADMIN — ALBUTEROL SULFATE 2.5 MG: 2.5 SOLUTION RESPIRATORY (INHALATION) at 16:49

## 2021-03-04 RX ADMIN — ATORVASTATIN CALCIUM 80 MG: 80 TABLET, FILM COATED ORAL at 20:43

## 2021-03-04 RX ADMIN — TIOTROPIUM BROMIDE INHALATION SPRAY 2 PUFF: 3.12 SPRAY, METERED RESPIRATORY (INHALATION) at 09:40

## 2021-03-04 RX ADMIN — TRAMADOL HYDROCHLORIDE 50 MG: 50 TABLET, FILM COATED ORAL at 23:54

## 2021-03-04 RX ADMIN — Medication 10 ML: at 20:43

## 2021-03-04 RX ADMIN — TRAMADOL HYDROCHLORIDE 50 MG: 50 TABLET, FILM COATED ORAL at 04:30

## 2021-03-04 RX ADMIN — TRAMADOL HYDROCHLORIDE 50 MG: 50 TABLET, FILM COATED ORAL at 16:01

## 2021-03-04 RX ADMIN — ASPIRIN 81 MG: 81 TABLET, CHEWABLE ORAL at 08:08

## 2021-03-04 ASSESSMENT — PAIN DESCRIPTION - ORIENTATION
ORIENTATION: LEFT

## 2021-03-04 ASSESSMENT — PAIN DESCRIPTION - DESCRIPTORS
DESCRIPTORS: ACHING

## 2021-03-04 ASSESSMENT — PAIN DESCRIPTION - LOCATION
LOCATION: BACK;SHOULDER
LOCATION: BACK;SHOULDER
LOCATION: ARM;SHOULDER
LOCATION: BACK;SHOULDER
LOCATION: SHOULDER
LOCATION: SHOULDER

## 2021-03-04 ASSESSMENT — PAIN DESCRIPTION - FREQUENCY
FREQUENCY: CONTINUOUS

## 2021-03-04 ASSESSMENT — PAIN SCALES - GENERAL
PAINLEVEL_OUTOF10: 6
PAINLEVEL_OUTOF10: 6
PAINLEVEL_OUTOF10: 3
PAINLEVEL_OUTOF10: 7

## 2021-03-04 ASSESSMENT — PAIN DESCRIPTION - ONSET
ONSET: ON-GOING

## 2021-03-04 ASSESSMENT — PAIN - FUNCTIONAL ASSESSMENT
PAIN_FUNCTIONAL_ASSESSMENT: PREVENTS OR INTERFERES SOME ACTIVE ACTIVITIES AND ADLS
PAIN_FUNCTIONAL_ASSESSMENT: PREVENTS OR INTERFERES SOME ACTIVE ACTIVITIES AND ADLS
PAIN_FUNCTIONAL_ASSESSMENT: ACTIVITIES ARE NOT PREVENTED
PAIN_FUNCTIONAL_ASSESSMENT: ACTIVITIES ARE NOT PREVENTED

## 2021-03-04 ASSESSMENT — PAIN DESCRIPTION - PAIN TYPE
TYPE: ACUTE PAIN

## 2021-03-04 ASSESSMENT — PAIN DESCRIPTION - PROGRESSION
CLINICAL_PROGRESSION: NOT CHANGED

## 2021-03-04 NOTE — CONSULTS
Clinical Pharmacy Progress Note  Medication History     Admit Date: 03/03/21    Asked to verify home medications by Dr. Margo Alfaro    List of of current medications patient is taking is complete. Home Medication list in EPIC updated to reflect changes noted below. Source of information: patient    Changes made to medication list:   Medications removed (no longer taking):  · Lidocaine patches    Medications added:   · Ibuprofen 800 as needed for pain    Other notes:   · Last reported dose of all medications was yesterday       Complete Home Medication List:    Current Outpatient Medications on File Prior to Encounter   Medication Sig    tiotropium (SPIRIVA) 18 MCG inhalation capsule Inhale 2 puffs into the lungs daily    ipratropium-albuterol (DUONEB) 0.5-2.5 (3) MG/3ML SOLN nebulizer solution Inhale 1 vial into the lungs every 4 hours    ibuprofen (ADVIL;MOTRIN) 800 MG tablet Take 800 mg by mouth as needed for Pain    escitalopram (LEXAPRO) 20 MG tablet Take 1 tablet by mouth daily     propranolol (INDERAL) 10 MG tablet Take 1 tablet by mouth daily     QUEtiapine (SEROQUEL) 50 MG tablet Take 150 mg by mouth daily        Please call with questions!     [address] [facility] [address]  3/3/2021 8:03 PM

## 2021-03-04 NOTE — H&P
Hospital Medicine History & Physical      PCP: [facility]    Date of Admission: 3/3/2021    Date of Service: Pt seen/examined on 2021 and Admitted to Inpatient with expected LOS greater than two midnights due to medical therapy. Chief Complaint:  Left shoulder pain, chest pressure      History Of Present Illness:     52 y.o. female Marcus Presley who is morbidly obese, history of hypertension, COPD, presented to ED with complaint of left shoulder pain, which started 3 weeks ago after a fall. While in the ED she was noted to be short of breath on ambulation, heart rate going into 130s. On further questioning she complained of chest tightness intermittent for the last few weeks sometimes at rest as well, along with endorsed bilateral lower external swelling worse at the end of the day. EKG showed septal Q waves. Initial troponin elevated 0.03. Placed on Nitropaste and heparin drip, cardiology consulted who recommended admission for NSTEMI and transferred to [facility].      On my evaluation at [facility] she complains of left shoulder pain, says she has chest pressure on the left side for the last few weeks, present at rest but worse with exertion. She tells me she smokes 1.5 pack/day. She tells me that chest pressure has improved with Nitropaste. Her follow-up troponin now down to less than 0.01. Her D-dimer is less than 200. X-ray of the chest showed normal cardiac silhouette, no cardiopulmonary abnormalities appreciated. X-ray of the left shoulder does not show acute fracture or dislocation.     Past Medical History:          Diagnosis Date    Anxiety     COPD (chronic obstructive pulmonary disease) ([address]     Degenerative disk disease     Depression     High blood pressure     MRSA (methicillin resistant staph aureus) culture positive        Past Surgical History:          Procedure Laterality Date     SECTION      FRACTURE SURGERY rods and plate in left foot    TUBAL LIGATION         Medications Prior to Admission:      Prior to Admission medications    Medication Sig Start Date End Date Taking? Authorizing Provider   tiotropium (SPIRIVA) 18 MCG inhalation capsule Inhale into the lungs daily Inhale 2 puffs by inhalation daily   Yes Historical Provider, MD   ipratropium-albuterol (DUONEB) 0.5-2.5 (3) MG/3ML SOLN nebulizer solution Inhale 1 vial into the lungs every 4 hours   Yes Historical Provider, MD   ibuprofen (ADVIL;MOTRIN) 800 MG tablet Take 800 mg by mouth as needed for Pain   Yes Historical Provider, MD   escitalopram (LEXAPRO) 20 MG tablet Take 1 tablet by mouth daily    Yes Historical Provider, MD   propranolol (INDERAL) 10 MG tablet Take 1 tablet by mouth daily    Yes Historical Provider, MD   QUEtiapine (SEROQUEL) 50 MG tablet Take 150 mg by mouth daily    Yes Historical Provider, MD       Allergies:  Cephalexin, Codeine, and Vancomycin    Social History:      The patient currently lives at home with family  She has 7 kids      TOBACCO:  Smoker 1.5 PPD  ETOH:   reports no history of alcohol use. Family History:      Reviewed        Problem Relation Age of Onset    Cancer Father        REVIEW OF SYSTEMS:   Pertinent positives as noted in the HPI. All other systems reviewed and negative. PHYSICAL EXAM PERFORMED:    /78   Pulse 94   Temp 98.5 °F (36.9 °C) (Oral)   Resp 16   Wt (!) 303 lb 12.8 oz (137.8 kg)   LMP 02/28/2021   SpO2 91%   BMI 61.36 kg/m²     General appearance: Morbidly female, no acute distress  HEENT:  Normal cephalic, atraumatic without obvious deformity. Pupils equal, round, and reactive to light. Extra ocular muscles intact. Conjunctivae/corneas clear. Neck: Supple, with full range of motion. No jugular venous distention. Trachea midline. Respiratory:  Normal respiratory effort. Clear to auscultation, bilaterally without Rales/Wheezes/Rhonchi.   Although limited exam due to body habitus  Cardiovascular:  Regular rate and rhythm with normal S1/S2 without murmurs, rubs or gallops. Abdomen: Soft, non-tender, non-distended with normal bowel sounds. Musculoskeletal:    Tender to palpation in the anterior left shoulder, decreased range of motion, pain on lifting the left arm above the horizontal.  Skin: Skin color, texture, turgor normal.  No rashes or lesions. Neurologic:  Neurovascularly intact without any focal sensory/motor deficits. Cranial nerves: II-XII intact, grossly non-focal.  Psychiatric:  Alert and oriented, thought content appropriate, normal insight  Capillary Refill: Brisk,< 3 seconds   Peripheral Pulses: +2 palpable, equal bilaterally       Labs:     Recent Labs     03/03/21  1408 03/03/21 2009   WBC 9.0 10.0   HGB 14.0 14.1   HCT 42.1 42.8    216     Recent Labs     03/03/21  1408      K 4.1   CL 97*   CO2 31   BUN 7   CREATININE 0.7   CALCIUM 9.1     Recent Labs     03/03/21  1408   AST 27   ALT 25   BILITOT <0.2   ALKPHOS 94     Recent Labs     03/03/21  2009   INR 1.03     Recent Labs     03/03/21  1408 03/03/21  1800 03/03/21 2009   TROPONINI 0.04* <0.01 <0.01       Urinalysis:      Lab Results   Component Value Date    NITRU Negative 05/04/2015    WBCUA None seen 08/01/2014    BACTERIA 1+ 08/01/2014    RBCUA 0-2 08/01/2014    BLOODU Negative 05/04/2015    SPECGRAV <=1.005 05/04/2015    GLUCOSEU Negative 05/04/2015       Radiology:     CXR: I have reviewed the CXR with the following interpretation: Reviewed  EKG:  I have reviewed the EKG with the following interpretation: Unable to review, slight Q waves present per emergency room physician in [facility] ed    XR CHEST (2 VW)   Final Result      CHEST: 2 views demonstrate clear lungs. Normal cardiac mediastinal silhouette. LEFT SHOULDER: 3 views demonstrate no abnormality. XR SHOULDER LEFT (MIN 2 VIEWS)   Final Result      CHEST: 2 views demonstrate clear lungs.  Normal cardiac mediastinal silhouette. LEFT SHOULDER: 3 views demonstrate no abnormality. ASSESSMENT:    Active Hospital Problems    Diagnosis Date Noted    NSTEMI (non-ST elevated myocardial infarction) [address] [I21.4] 03/03/2021    Dyspnea on exertion [R06.00] 03/03/2021    Morbid (severe) obesity due to excess calories ([facility] [E66.01] 03/03/2021    Family history of heart failure, positiive TITIN GENE in the family [Z82.49] 03/03/2021     Dyspnea on exertion likely due to Non-ST elevation myocardial infarction, with complains of chest pressure, troponin 0 0.03, septal Q waves per emergency room physician on EKG. Family history of heart failure, positive TIITN gene per patient   Continue Nitropaste 0.5 inch every 6 hours  Continue heparin drip  Aspirin, high intensity statin  Check A1c, lipid profile tomorrow morning  Okay for diet without caffeine for now  With her typical symptoms she would benefit from cardiac cath, cardiology already consulted by the ED we will reconsult so we can see the patient in the morning    COPD without acute exacerbation  Continue home Spiriva  Breathing treatments as needed    Nicotine dependence  Nicotine patch added  Counseled on smoking cessation    Depression/anxiety  Continue home Lexapro, Seroquel    Left shoulder pain, no acute fracture appreciated on x-ray  Voltaren gel as needed  Avoid narcotic pain medications    Morbid obesity due to excess calories Body mass index is 61.36 kg/m². - Complicating assessment and treatment. Placing patient at risk for multiple co-morbidities as well as early death and contributing to the patient's presentation. Counseled on weight loss       DVT Prophylaxis: Heparin drip  Diet: DIET CLEAR LIQUID; No Caffeine  Diet NPO, After Midnight  Code Status: Full Code    PT/OT Eval Status: Order once cardiac evaluation completed    Dispo - Admit as inpatient.  I anticipate hospitalization spanning more than two midnights for investigation and treatment of the above medically necessary diagnoses. Raeann Koroma MD   Hospitalist    Thank you [facility] for the opportunity to be involved in this patient's care. If you have any questions or concerns please feel free to contact me at [phone].

## 2021-03-04 NOTE — PROGRESS NOTES
Hospitalist Progress Note      PCP: [facility]    Date of Admission: 3/3/2021    Chief Complaint on Admission: chest pain    Pt Seen/Examined and Chart Reviewed. Admitting dx NSTEMI    SUBJECTIVE/OBJECTIVE:   Patient is admitted with NSTEMI. She is young but smokes and has co-morbidities. Started on heparin drip on admission. Today she reports no chest pain. She continues to have left shoulder discomfort which is more MSK. On supplemental O2 at 3 liters, does not use home O2. Allergies  Cephalexin, Codeine, and Vancomycin    Medications      Scheduled Meds:   sodium chloride flush  10 mL Intravenous 2 times per day    aspirin  81 mg Oral Daily    atorvastatin  80 mg Oral Nightly    nitroglycerin  0.5 inch Topical 4 times per day    QUEtiapine  150 mg Oral Daily    tiotropium  2 puff Inhalation Daily    escitalopram  20 mg Oral Daily    albuterol  2.5 mg Nebulization Q4H WA    nicotine  1 patch Transdermal Daily       Infusions:   heparin (PORCINE) Infusion 11 Units/kg/hr (21 8921)       PRN Meds:  nicotine polacrilex, sodium chloride flush, promethazine **OR** ondansetron, acetaminophen **OR** acetaminophen, polyethylene glycol, perflutren lipid microspheres, heparin (porcine), heparin (porcine), diclofenac sodium, traMADol, ipratropium-albuterol, albuterol    Vitals    TEMPERATURE:  Current - Temp: 97.8 °F (36.6 °C);  Max - Temp  Av °F (36.7 °C)  Min: 97.6 °F (36.4 °C)  Max: 98.5 °F (36.9 °C)  RESPIRATIONS RANGE: Resp  Av.3  Min: 13  Max: 21  PULSE RANGE: Pulse  Av.9  Min: 93  Max: 118  BLOOD PRESSURE RANGE:  Systolic (56TJI), TNH:270 , Min:107 , ZRK:307   ; Diastolic (20DAI), LMH:39, Min:65, Max:92    PULSE OXIMETRY RANGE: SpO2  Av.7 %  Min: 89 %  Max: 96 %  24HR INTAKE/OUTPUT:      Intake/Output Summary (Last 24 hours) at 3/4/2021 0950  Last data filed at 3/4/2021 0431  Gross per 24 hour   Intake 240 ml   Output 850 ml   Net -610 ml       Exam:      General appearance: No apparent distress, appears stated age and cooperative. Lungs: diminished without crackles or wheezing  Heart: Regular rate and rhythm with Normal S1/S2 without  murmurs, rubs or gallops, point of maximum impulse non-displaced  Abdomen: Soft, non-tender or non-distended without rigidity or guarding and positive bowel sounds all four quadrants. Extremities: No clubbing, cyanosis, 1+ pitting edema bilaterally. Skin: Skin color, texture, turgor normal.    Neurologic: Alert and oriented X 3,  grossly non-focal.  Mental status: Alert, oriented, thought content appropriate. Data    Recent Labs     03/03/21  1408 03/03/21 2009 03/04/21  0620   WBC 9.0 10.0 7.9   HGB 14.0 14.1 13.6   HCT 42.1 42.8 40.5    216 187      Recent Labs     03/03/21  1408 03/04/21  0620    136   K 4.1 4.9   CL 97* 99   CO2 31 30   BUN 7 9   CREATININE 0.7 0.8     Recent Labs     03/03/21  1408   AST 27   ALT 25   BILITOT <0.2   ALKPHOS 94     Recent Labs     03/03/21 2009   INR 1.03     Recent Labs     03/03/21  1800 03/03/21 2009 03/03/21  2222   TROPONINI <0.01 <0.01 <0.01       Consults:     IP CONSULT TO CARDIOLOGY  IP CONSULT TO PHARMACY  IP CONSULT TO SOCIAL WORK    Active Hospital Problems    Diagnosis Date Noted    NSTEMI (non-ST elevated [facility] infarction) [facility] [I21.4] 03/03/2021    Dyspnea on exertion [R06.00] 03/03/2021    Morbid (severe) obesity due to excess calories ([facility] [address] [E66.01] 03/03/2021    Family history of heart failure, positiive TITIN GENE in the family [Z82.49] 03/03/2021         ASSESSMENT AND PLAN        1. Non-ST elevation myocardial infarction:  Continue Nitropaste 0.5 inch every 6 hours  Continue heparin drip  Aspirin, high intensity statin  pending A1c, lipid profile   NPO P MN until seen by cardiology in case if needs cardiac cath     2. COPD without acute exacerbation  Continue home Spiriva  Breathing treatments as needed     3.  Nicotine dependence  Nicotine patch added  Counseled on smoking cessation     4. Depression/anxiety  Continue home Lexapro, Seroquel     5. Left shoulder pain, no acute fracture appreciated on x-ray  Voltaren gel as needed  Avoid narcotic pain medications     6. Morbid obesity due to excess calories Body mass index is 61.36 kg/m². - Complicating assessment and treatment. Placing patient at risk for multiple co-morbidities as well as early death and contributing to the patient's presentation.   Counseled on weight loss      DVT Prophylaxis: heparin drip  Diet: Diet NPO, After Midnight  Code Status: Full Code    PT/OT Eval Status:at baseline    Dispo - Harpreet Hayes MD

## 2021-03-04 NOTE — PROGRESS NOTES
4 Eyes Admission Assessment     I agree as the admission nurse that 2 RN's have performed a thorough Head to Toe Skin Assessment on the patient. ALL assessment sites listed below have been assessed on admission. Areas assessed by both nurses:   [x]   Head, Face, and Ears   [x]   Shoulders, Back, and Chest  [x]   Arms, Elbows, and Hands   [x]   Coccyx, Sacrum, and Ischium  [x]   Legs, Feet, and Heels        Does the Patient have Skin Breakdown?   No         Lb Prevention initiated:  No   Wound Care Orders initiated:  No      [facility] nurse consulted for Pressure Injury (Stage 3,4, Unstageable, DTI, NWPT, and Complex wounds) or Lb score 18 or lower:  No      Nurse 1 eSignature: Electronically signed by Mauro Ruelas RN on 3/4/21 at 2:07 AM EST    **SHARE this note so that the co-signing nurse is able to place an eSignature**    Nurse 2 eSignature: Electronically signed by Natacha Wheeler RN on 3/4/21 at 2:07 AM EST

## 2021-03-04 NOTE — CONSULTS
Social History:   reports that she has been smoking cigarettes. She has been smoking about 1.50 packs per day. She has never used smokeless tobacco. She reports that she does not drink alcohol or use drugs. Family History:  No evidence for sudden cardiac death or premature CAD      Medications:       Home Medications  Were reviewed and are listed in nursing record. and/or listed below  Prior to Admission medications    Medication Sig Start Date End Date Taking?  Authorizing Provider   tiotropium (SPIRIVA) 18 MCG inhalation capsule Inhale into the lungs daily Inhale 2 puffs by inhalation daily   Yes Historical Provider, MD   ipratropium-albuterol (DUONEB) 0.5-2.5 (3) MG/3ML SOLN nebulizer solution Inhale 1 vial into the lungs every 4 hours   Yes Historical Provider, MD   ibuprofen (ADVIL;MOTRIN) 800 MG tablet Take 800 mg by mouth as needed for Pain   Yes Historical Provider, MD   escitalopram (LEXAPRO) 20 MG tablet Take 1 tablet by mouth daily    Yes Historical Provider, MD   propranolol (INDERAL) 10 MG tablet Take 1 tablet by mouth daily    Yes Historical Provider, MD   QUEtiapine (SEROQUEL) 50 MG tablet Take 150 mg by mouth daily    Yes Historical Provider, MD          Inpatient Medications:   sodium chloride flush  10 mL Intravenous 2 times per day    aspirin  81 mg Oral Daily    atorvastatin  80 mg Oral Nightly    nitroglycerin  0.5 inch Topical 4 times per day    QUEtiapine  150 mg Oral Daily    tiotropium  2 puff Inhalation Daily    escitalopram  20 mg Oral Daily    albuterol  2.5 mg Nebulization Q4H WA    nicotine  1 patch Transdermal Daily       IV drips:   heparin (PORCINE) Infusion 11 Units/kg/hr (03/03/21 1363)       PRN:  nicotine polacrilex, sodium chloride flush, promethazine **OR** ondansetron, acetaminophen **OR** acetaminophen, polyethylene glycol, perflutren lipid microspheres, heparin (porcine), heparin (porcine), diclofenac sodium, traMADol, ipratropium-albuterol, albuterol    Allergy:     Cephalexin, Codeine, and Vancomycin       Review of Systems:     All 12 point review of symptoms completed. Pertinent positives identified in the HPI, all other review of symptoms negative as below. CONSTITUTIONAL: Nounanticipated weight loss. No change in energy level, sleep pattern, or activity level. SKIN: No rash or pruritis. EYES: No visual changes or diplopia. No scleral icterus. ENT: No Headaches, hearing loss or vertigo. No mouth sores or sore throat. CARDIOVASCULAR: No chest pain/chest pressure/chest discomfort. No palpitations. RESPIRATORY: No cough or wheezing, no sputum production. No hematemesis. GASTROINTESTINAL: No N/V/D. No abdominal pain, appetite loss, blood in stools. GENITOURINARY: No dysuria, trouble voiding, or hematuria. MUSCULOSKELETAL:  No gait disturbance, weakness or joint complaints. NEUROLOGICAL: No headache, diplopia, change in muscle strength, numbness or tingling. No change in gait, balance, coordination, mood, affect, memory, mentation, behavior. PSHYCH: No anxiety, loss of interest, change in sexual behavior, feelings of self-harm, or confusion. ENDOCRINE: No malaise, fatigue or temperature intolerance. No excessive thirst, fluid intake, or urination. No tremor. HEMATOLOGIC: No abnormal bruising or bleeding. ALLERGY: No nasal congestion or hives. Physical Examination:     Vitals:    03/04/21 0424 03/04/21 0429 03/04/21 0551 03/04/21 0750   BP: 107/65  122/78 133/77   Pulse: 93   95   Resp: 21 20   Temp: 97.6 °F (36.4 °C)   97.8 °F (36.6 °C)   TempSrc: Oral   Oral   SpO2: 93%   93%   Weight:  (!) 302 lb 4.8 oz (137.1 kg)         Wt Readings from Last 3 Encounters:   03/04/21 (!) 302 lb 4.8 oz (137.1 kg)   09/21/20 245 lb (111.1 kg)   06/29/18 239 lb 3.2 oz (108.5 kg)       Objective:  General Appearance: In no acute distress and not in pain.     Vital signs: (most recent): Blood pressure 133/77, pulse 95, temperature 97.8 °F (36.6 °C), temperature source Oral, resp. rate 20, weight (!) 302 lb 4.8 oz (137.1 kg), last menstrual period 02/28/2021, SpO2 93 %. Lungs:  Increased effort. There are decreased breath sounds and wheezes. Heart: Tachycardia. Regular rhythm. No murmur or gallop. Chest: No chest wall tenderness. Abdomen: Abdomen is soft. Bowel sounds are normal.     Extremities: (2+ pitting edema b/l up to shins)  Skin:  Warm and dry. Labs:     Recent Labs     03/03/21  1408 03/04/21  0620    136   K 4.1 4.9   BUN 7 9   CREATININE 0.7 0.8   CL 97* 99   CO2 31 30   GLUCOSE 153* 148*   CALCIUM 9.1 8.8     Recent Labs     03/03/21  1408 03/03/21 2009 03/04/21  0620   WBC 9.0 10.0 7.9   HGB 14.0 14.1 13.6   HCT 42.1 42.8 40.5    216 187   MCV 96.3 97.5 96.2     No results for input(s): CHOLTOT, TRIG, HDL in the last 72 hours. Invalid input(s): [address] [facility] [place], [place], VLDCHOL, LDL  Recent Labs     03/03/21 2009   INR 1.03     Recent Labs     03/03/21  1408 03/03/21  1800 03/03/21 2009 03/03/21  2222   TROPONINI 0.04* <0.01 <0.01 <0.01     No results for input(s): BNP in the last 72 hours. No results for input(s): TSH in the last 72 hours. No results for input(s): CHOL, HDL, LDLCALC, TRIG in the last 72 hours.]    Lab Results   Component Value Date    TROPONINI <0.01 03/03/2021         Imaging:     I personally reviewed imaging studies including CXR, Stress test, TTE/LUCA. Last ECG (if available) - EKG:  I have reviewed EKG with the following interpretation  NSR, septal Q waves    Telemetry:  NSR    Last Stress (if available):    Last TTE/LUCA(if available):  Echo 3/2019: The left ventricular wall motion is normal.    There is trace mitral regurgitation.     Last Cath (if available):    Last CMR  (if available):      Assessment / Plan:   NSTEMI  - On nitropaste, ASA 81, lipitor 80  - heparin gtt  - f/u Echo    HTN  - BP currently controlled with nitropaste  - Consider changing home propanolol given COPD and dyspnea    Tobacco use was discussed with the patient and educated on the negative effects. I have asked the patient to not utilize these agents. Thank you for allowing to us to participate in the [facility] [place]. Further evaluation will be based upon the patient's clinical course and testing results. I have spent 40 minutes of face to face time with the patient with more than 50% spent counseling and coordinating care. All questions and concerns were addressed to the patient/family. Alternatives to my treatment were discussed. The note was completed using EMR. Every effort was made to ensure accuracy; however, inadvertent computerized transcription errors may be present. I have personally reviewed the reports and images of labs, radiological studies, cardiac studies including ECG's and telemetry, current and old medical records. Cheri Hansen MD      Staff Note      Patient seen and evaluated with the medical resident. I was physically present during the critical portions of the service when performed by the resident including the assessment and management of the patient. Echo within normal limits.  ecg with no acute ST changes and unchanged from report in 2019 of ECG. CP is very atypical.  Treat HBP with lisinopril 5 mg daily. Can stop heparin and stop tobacco abuse. Stable for discharge from the cardiac perspective. I agree with the findings and plans as described.

## 2021-03-04 NOTE — DISCHARGE INSTR - COC
Continuity of Care Form    Patient Name: Theresa Bean   :  1970  MRN:  [id number]    Admit date:  3/3/2021  Discharge date:  ***    Code Status Order: Full Code   Advance Directives:   Advance Care Flowsheet Documentation     Date/Time Healthcare Directive Type of Healthcare Directive Copy in [address] Agent's Name Healthcare Agent's Phone Number    [phone]  No, patient does not have an advance directive for healthcare treatment -- -- -- -- --          Admitting Physician:  Hayley Jimenez MD  PCP: [address] [place] [address]    Discharging Nurse: [facility] Unit/Room#: 8295/0849-34  Discharging Unit Phone Number: ***    Emergency Contact:   Extended Emergency Contact Information  Primary Emergency Contact: [facility] Phone: [phone]  Relation: Niece/Nephew  Secondary Emergency Contact: Shefali Mathew  Address: [address] [place] [address], [address] Phone: [phone]  Relation: Other    Past Surgical History:  Past Surgical History:   Procedure Laterality Date     SECTION      FRACTURE SURGERY      rods and plate in left foot    TUBAL LIGATION         Immunization History: There is no immunization history on file for this patient.     Active Problems:  Patient Active Problem List   Diagnosis Code    NSTEMI (non-ST elevated myocardial infarction) ([facility] I21.4    Dyspnea on exertion R06.00    Morbid (severe) obesity due to excess calories ([facility] E66.01    Family history of heart failure, positiive TITIN GENE in the family Z82.49       Isolation/Infection:   Isolation          No Isolation        Patient Infection Status     None to display          Nurse Assessment:  Last Vital Signs: /77   Pulse 95   Temp 97.8 °F (36.6 °C) (Oral)   Resp 19   Wt (!) 302 lb 4.8 oz (137.1 kg)   LMP 2021   SpO2 96%   BMI 61.06 kg/m² Last documented pain score (0-10 scale): Pain Level: 8  Last Weight:   Wt Readings from Last 1 Encounters:   21 (!) 302 lb 4.8 oz (137.1 kg)     Mental Status:  {IP PT MENTAL STATUS:}    IV Access:  { ZEENAT IV ACCESS:094424333}    Nursing Mobility/ADLs:  Walking   {CHP DME LZAV:125953299}  Transfer  {CHP DME IPET:649147821}  Bathing  {CHP DME YMSL:605387002}  Dressing  {CHP DME USKP:995521691}  Toileting  {CHP DME MNCC:513978262}  Feeding  {CHP DME NIYC:872428454}  Med Admin  {P DME WOBV:287425184}  Med Delivery   { ZEENAT MED Delivery:284480999}    Wound Care Documentation and Therapy:        Elimination:  Continence:   · Bowel: {YES / ZN:47937}  · Bladder: {YES / BZ:15638}  Urinary Catheter: {Urinary Catheter:243188134}   Colostomy/Ileostomy/Ileal Conduit: {YES / CI:86974}       Date of Last BM: ***    Intake/Output Summary (Last 24 hours) at 3/4/2021 1032  Last data filed at 3/4/2021 0431  Gross per 24 hour   Intake 240 ml   Output 850 ml   Net -610 ml     I/O last 3 completed shifts:   In: 240 [P.O.:240]  Out: 850 [Urine:850]    Safety Concerns:     [employer] Safety Concerns:618036792}    Impairments/Disabilities:      [employer] Impairments/Disabilities:256539112}    Nutrition Therapy:  Current Nutrition Therapy:   [employer] Diet List:800745410}    Routes of Feeding: {P DME Other Feedings:669183064}  Liquids: {Slp liquid thickness:16740}  Daily Fluid Restriction: {CHP DME Yes amt example:139355927}  Last Modified Barium Swallow with Video (Video Swallowing Test): {Done Not Done YGVD:985596449}    Treatments at the Time of Hospital Discharge:   Respiratory Treatments: ***  Oxygen Therapy:  {Therapy; copd oxygen:77685}  Ventilator:    { CC Vent CKHT:441420078}    Rehab Therapies: {THERAPEUTIC INTERVENTION:5855423377}  Weight Bearing Status/Restrictions: [address] [employer] Weight Bearin}  Other Medical Equipment (for information only, NOT a DME order):  {EQUIPMENT:215642748}  Other Treatments: *** Patient's personal belongings (please select all that are sent with patient):  {CHP DME Belongings:463822804}    RN SIGNATURE:  {Esignature:822192380}    CASE MANAGEMENT/SOCIAL WORK SECTION    Inpatient Status Date: ***    Readmission Risk Assessment Score:  Readmission Risk              Risk of Unplanned Readmission:        9           Discharging to Facility/ Agency   · Name:   · Address:  · Phone:  Fax:    [facility]  There are many trusted resources available to help during your tobacco quit journey. Here are some national programs and organizations that we recommend. Nicotine Anonymous  https://nicotine-anonymous. org/      Centers for Disease Control and Prevention  [employer].co.nz     Smoking Cessation Leadership Center  http://smokingcessationleadership.[facility].edu/  [address] Quit Smoking Classes - Please call [phone] to register or find one near you. / signature: Electronically signed by Natasha Rivera RN on 3/4/21 at 10:34 AM EST    PHYSICIAN SECTION    Prognosis: {Prognosis:8058460198}    Condition at Discharge: [facility] Jonna Jama Patient Condition:398386693}    Rehab Potential (if transferring to Rehab): {Prognosis:1956535982}    Recommended Labs or Other Treatments After Discharge: ***    Physician Certification: I certify the above information and transfer of Jaspreet Rondon  is necessary for the continuing treatment of the diagnosis listed and that she requires {Admit to Appropriate Level of Care:94376} for {GREATER/LESS:649109917} 30 days.      Update Admission H&P: {CHP DME Changes in [facility]:606211707}    PHYSICIAN SIGNATURE:  {Esignature:202124347}

## 2021-03-04 NOTE — PROGRESS NOTES
Pt admitted to room 6319. Pt AxOx4 and VSS. Pt oriented to room and to call light. Pt placed on fall precautions with bed alarm in place. Pt placed on tele per orders. Pt skin and admission assessment complete. Pt instructed to call for further needs. Pt verbalized understanding. Will continue to monitor.

## 2021-03-04 NOTE — PROGRESS NOTES
RESPIRATORY THERAPY ASSESSMENT    Name:  [address] Record Number:  [id number]  Age: 52 y.o. Gender: female  : 1970  Today's Date:  3/3/2021  Room:  6319/6319-01    Assessment     Is the patient being admitted for a COPD or Asthma exacerbation? No   (If yes the patient will be seen every 4 hours for the first 24 hours and then reassessed)    Patient Admission Diagnosis      Allergies  Allergies   Allergen Reactions    Cephalexin Anaphylaxis    Codeine      Tylenol #3      Vancomycin      Elevates creatnine. Pulmonary History:COPD  Home Oxygen Therapy:  room air  Home Respiratory Therapy:Albuterol/Ipratropium Bromide HHN and Tiotropium Bromide   Current Respiratory Therapy:  spiriva q dialy. duoneb q4  Treatment Type: IS, HHN  Medications: Albuterol/Ipratropium    Respiratory Severity Index(RSI)   Patients with orders for inhalation medications, oxygen, or any therapeutic treatment modality will be placed on Respiratory Protocol. They will be assessed with the first treatment and at least every 72 hours thereafter. The following severity scale will be used to determine frequency of treatment intervention.     Smoking History: Pulmonary Disease or Smoking History, Greater than 15 pack year = 2    Social History  Social History     Tobacco Use    Smoking status: Current Every Day Smoker     Packs/day: 0.25     Types: Cigarettes    Smokeless tobacco: Never Used   Substance Use Topics    Alcohol use: No    Drug use: No       Recent Surgical History: None = 0  Past Surgical History  Past Surgical History:   Procedure Laterality Date     SECTION      FRACTURE SURGERY      rods and plate in left foot    TUBAL LIGATION         Level of Consciousness: Alert, Oriented, and Cooperative = 0    Level of Activity: Walking with assistance = 1    Respiratory Pattern: Regular Pattern; RR 8-20 = 0    Breath Sounds: Diminshed bilaterally and/or crackles = 2    Sputum   ,  , of the following criteria are met  a. Incognizant or uncooperative          b. Patients treated with HHN at Home        c. Unable to demonstrate proper use of MDI with spacer     d. RR > 30 bpm   5. Bronchodilators will be delivered via Metered Dose Inhaler (MDI), HHN, Aerogen to intubated patients on mechanical ventilation. 6. Inhalation medication orders will be delivered and/or substituted as outlined below. Aerosolized Medications Ordering and Administration Guidelines:    1. All Medications will be ordered by a physician, and their frequency and/or modality will be adjusted as defined by the patients Respiratory Severity Index (RSI) score. 2. If the patient does not have documented COPD, consider discontinuing anticholinergics when RSI is less than 9.  3. If the bronchospasm worsens (increased RSI), then the bronchodilator frequency can be increased to a maximum of every 4 hours. If greater than every 4 hours is required, the physician will be contacted. 4. If the bronchospasm improves, the frequency of the bronchodilator can be decreased, based on the patient's RSI, but not less than home treatment regimen frequency. 5. Bronchodilator(s) will be discontinued if patient has a RSI less than 9 and has received no scheduled or as needed treatment for 72  Hrs. Patients Ordered on a Mucolytic Agent:    1. Must always be administered with a bronchodilator. 2. Discontinue if patient experiences worsened bronchospasm, or secretions have lessened to the point that the patient is able to clear them with a cough. Anti-inflammatory and Combination Medications:    1. If the patient lacks prior history of lung disease, is not using inhaled anti-inflammatory medication at home, and lacks wheezing by examination or by history for at least 24 hours, contact physician for possible discontinuation.

## 2021-03-04 NOTE — PLAN OF CARE
Problem: Falls - Risk of:  Goal: Will remain free from falls  Description: Will remain free from falls  3/4/2021 1606 by Rainer Marcum RN  Note: Pt at risk for falls. Bed locked in lowest position with bed alarm on. Call light and personal belongings within reach. Instructed to call for assistance. Calls appropriately. Will continue to monitor. Problem: Pain:  Goal: Pain level will decrease  Description: Pain level will decrease  3/4/2021 1606 by Rainer Marcum RN  Outcome: Ongoing  Note: Complains of 8/10 pain in the back and shoulder. PRN Tramadol administered per MAR. Will continue to monitor.

## 2021-03-04 NOTE — CARE COORDINATION
This patient independent PTA. Cardiology testing. Anticipate no needs at discharge.  Electronically signed by Thai Martínez RN on 3/4/2021 at 2:02 PM

## 2021-03-05 VITALS
HEART RATE: 96 BPM | BODY MASS INDEX: 59.16 KG/M2 | OXYGEN SATURATION: 90 % | DIASTOLIC BLOOD PRESSURE: 65 MMHG | SYSTOLIC BLOOD PRESSURE: 125 MMHG | TEMPERATURE: 95 F | RESPIRATION RATE: 18 BRPM | WEIGHT: 292.9 LBS

## 2021-03-05 LAB
ANION GAP SERPL CALCULATED.3IONS-SCNC: 8 MMOL/L (ref 3–16)
BUN BLDV-MCNC: 9 MG/DL (ref 7–20)
CALCIUM SERPL-MCNC: 9.3 MG/DL (ref 8.3–10.6)
CHLORIDE BLD-SCNC: 98 MMOL/L (ref 99–110)
CO2: 31 MMOL/L (ref 21–32)
CREAT SERPL-MCNC: 0.7 MG/DL (ref 0.6–1.1)
ESTIMATED AVERAGE GLUCOSE: 114 MG/DL
GFR AFRICAN AMERICAN: >60
GFR NON-AFRICAN AMERICAN: >60
GLUCOSE BLD-MCNC: 134 MG/DL (ref 70–99)
HBA1C MFR BLD: 5.6 %
MAGNESIUM: 2.1 MG/DL (ref 1.8–2.4)
POTASSIUM SERPL-SCNC: 4.3 MMOL/L (ref 3.5–5.1)
SODIUM BLD-SCNC: 137 MMOL/L (ref 136–145)

## 2021-03-05 PROCEDURE — 6370000000 HC RX 637 (ALT 250 FOR IP): Performed by: INTERNAL MEDICINE

## 2021-03-05 PROCEDURE — 94640 AIRWAY INHALATION TREATMENT: CPT

## 2021-03-05 PROCEDURE — 6360000002 HC RX W HCPCS: Performed by: INTERNAL MEDICINE

## 2021-03-05 PROCEDURE — 94761 N-INVAS EAR/PLS OXIMETRY MLT: CPT

## 2021-03-05 PROCEDURE — 36415 COLL VENOUS BLD VENIPUNCTURE: CPT

## 2021-03-05 PROCEDURE — 80048 BASIC METABOLIC PNL TOTAL CA: CPT

## 2021-03-05 PROCEDURE — 2580000003 HC RX 258: Performed by: INTERNAL MEDICINE

## 2021-03-05 PROCEDURE — 83735 ASSAY OF MAGNESIUM: CPT

## 2021-03-05 RX ORDER — ATORVASTATIN CALCIUM 40 MG/1
40 TABLET, FILM COATED ORAL DAILY
Qty: 30 TABLET | Refills: 3 | Status: ON HOLD | OUTPATIENT
Start: 2021-03-05 | End: 2021-03-24 | Stop reason: HOSPADM

## 2021-03-05 RX ORDER — ASPIRIN 81 MG/1
81 TABLET ORAL DAILY
Qty: 30 TABLET | Refills: 3 | Status: ON HOLD | OUTPATIENT
Start: 2021-03-05 | End: 2021-03-24 | Stop reason: HOSPADM

## 2021-03-05 RX ORDER — FUROSEMIDE 20 MG/1
20 TABLET ORAL DAILY
Qty: 30 TABLET | Refills: 3 | Status: SHIPPED | OUTPATIENT
Start: 2021-03-05 | End: 2021-07-12 | Stop reason: SDUPTHER

## 2021-03-05 RX ORDER — LISINOPRIL 5 MG/1
5 TABLET ORAL DAILY
Qty: 30 TABLET | Refills: 3 | Status: SHIPPED | OUTPATIENT
Start: 2021-03-06 | End: 2021-07-12 | Stop reason: SDUPTHER

## 2021-03-05 RX ORDER — NICOTINE 21 MG/24HR
1 PATCH, TRANSDERMAL 24 HOURS TRANSDERMAL DAILY
Qty: 30 PATCH | Refills: 3 | Status: SHIPPED | OUTPATIENT
Start: 2021-03-06

## 2021-03-05 RX ADMIN — ESCITALOPRAM OXALATE 20 MG: 20 TABLET ORAL at 09:04

## 2021-03-05 RX ADMIN — TIOTROPIUM BROMIDE INHALATION SPRAY 2 PUFF: 3.12 SPRAY, METERED RESPIRATORY (INHALATION) at 07:40

## 2021-03-05 RX ADMIN — QUETIAPINE FUMARATE 150 MG: 300 TABLET ORAL at 09:03

## 2021-03-05 RX ADMIN — ALBUTEROL SULFATE 2.5 MG: 2.5 SOLUTION RESPIRATORY (INHALATION) at 07:41

## 2021-03-05 RX ADMIN — LISINOPRIL 5 MG: 5 TABLET ORAL at 09:03

## 2021-03-05 RX ADMIN — ASPIRIN 81 MG: 81 TABLET, CHEWABLE ORAL at 09:03

## 2021-03-05 RX ADMIN — TRAMADOL HYDROCHLORIDE 50 MG: 50 TABLET, FILM COATED ORAL at 06:32

## 2021-03-05 RX ADMIN — Medication 10 ML: at 09:04

## 2021-03-05 RX ADMIN — ACETAMINOPHEN 650 MG: 325 TABLET ORAL at 09:13

## 2021-03-05 ASSESSMENT — PAIN DESCRIPTION - FREQUENCY: FREQUENCY: CONTINUOUS

## 2021-03-05 ASSESSMENT — PAIN SCALES - GENERAL
PAINLEVEL_OUTOF10: 7
PAINLEVEL_OUTOF10: 9
PAINLEVEL_OUTOF10: 7

## 2021-03-05 ASSESSMENT — PAIN - FUNCTIONAL ASSESSMENT: PAIN_FUNCTIONAL_ASSESSMENT: ACTIVITIES ARE NOT PREVENTED

## 2021-03-05 ASSESSMENT — PAIN DESCRIPTION - DESCRIPTORS: DESCRIPTORS: ACHING

## 2021-03-05 ASSESSMENT — PAIN DESCRIPTION - LOCATION: LOCATION: BACK;SHOULDER

## 2021-03-05 NOTE — DISCHARGE SUMMARY
Hospital Medicine Discharge Summary      Patient ID: Chago Cantu      Patient's PCP: Gabriela Casper    Admit Date: 3/3/2021     Discharge Date: 3/5/2021      Admitting Physician: Narda Quiroz MD    Discharge Physician: Harley Jones MD     Discharge Diagnoses: Active Hospital Problems    Diagnosis Date Noted    [facility]STEMI (non-ST elevated [facility] infarction) [facility] [I21.4] 03/03/2021    Dyspnea on exertion [R06.00] 03/03/2021    Morbid (severe) obesity due to excess calories ([facility] [E66.01] 03/03/2021    Family history of heart failure, positiive TITIN GENE in the family [Z82.49] 03/03/2021         The patient was seen and examined on day of discharge and this discharge summary is in conjunction with any daily progress note from day of discharge. Hospital Course:     Patient is a 51 y/o female with medical h/o HTN, COPD, also family history of genetic cardiomyopathy, presented with shortness of breath and chest pain. Work up in the ER showed negative Ddimer but minimally elevated troponin. Cardiology was consulted and patient was started on heparin drip. Patient was seen by cardiology and underwent ECHO, which was stable. Chest pain resolved. Heparin drip was stopped and patient was treated with aspirin, statin and lisinopril. Her chest pain completely resolved. However she remains hypoxic which was not her baseline. There was some leg edema, although pro BNP was normal. She was given dose of IV lasix and diuresed 10 lbs. Dyspnea resolved. Patient was discharged to home is stable condition with aspirin, statin, lisinopril and low dose lasix. Script for BMP in 1 week was provided. Patient will follow up with cardiology.        Consults:     IP CONSULT TO CARDIOLOGY  IP CONSULT TO PHARMACY  IP CONSULT TO SOCIAL WORK    Disposition: Home    Discharged Condition: Stable    Code Status: Prior    Activity: activity as tolerated    Diet: cardiac diet    Follow Up: Primary Care Physician in one week    Exam:     General appearance: No apparent distress, appears stated age and cooperative. Lungs: diminished without wheezing or crackles  Heart: Regular rate and rhythm with Normal S1/S2 without  murmurs, rubs or gallops, point of maximum impulse non-displaced  Abdomen: Soft, non-tender or non-distended without rigidity or guarding and positive bowel sounds all four quadrants. Extremities: No clubbing, cyanosis, or edema bilaterally. Skin: Skin color, texture, turgor normal.    Neurologic: Alert and oriented X 3, grossly non-focal.  Mental status: Alert, oriented, thought content appropriate      Labs:  For convenience and continuity at follow-up the following most recent labs are provided:    CBC:   Lab Results   Component Value Date    WBC 7.9 03/04/2021    HGB 13.6 03/04/2021    HCT 40.5 03/04/2021     03/04/2021       RENAL:   Lab Results   Component Value Date     03/05/2021    K 4.3 03/05/2021    K 4.9 03/04/2021    CL 98 03/05/2021    CO2 31 03/05/2021    BUN 9 03/05/2021    CREATININE 0.7 03/05/2021           Discharge Medications:   Discharge Medication List as of 3/5/2021 10:41 AM           Details   aspirin EC 81 MG EC tablet Take 1 tablet by mouth daily, Disp-30 tablet, R-3Print      atorvastatin (LIPITOR) 40 MG tablet Take 1 tablet by mouth daily, Disp-30 tablet, R-3Print      lisinopril (PRINIVIL;ZESTRIL) 5 MG tablet Take 1 tablet by mouth daily, Disp-30 tablet, R-3Print      diclofenac sodium (VOLTAREN) 1 % GEL Apply 4 g topically 4 times daily as needed for Pain, Topical, 4 TIMES DAILY PRN Starting Fri 3/5/2021, Disp-100 g, R-1, Print      nicotine (NICODERM CQ) 21 MG/24HR Place 1 patch onto the skin daily, Disp-30 patch, R-3Print      furosemide (LASIX) 20 MG tablet Take 1 tablet by mouth daily, Disp-30 tablet, R-3Print              Details   tiotropium (SPIRIVA) 18 MCG inhalation capsule Inhale into the lungs daily Inhale 2 puffs by inhalation dailyHistorical Med ipratropium-albuterol (DUONEB) 0.5-2.5 (3) MG/3ML SOLN nebulizer solution Inhale 1 vial into the lungs every 4 hoursHistorical Med      ibuprofen (ADVIL;MOTRIN) 800 MG tablet Take 800 mg by mouth as needed for PainHistorical Med      escitalopram (LEXAPRO) 20 MG tablet Take 1 tablet by mouth daily Historical Med      propranolol (INDERAL) 10 MG tablet Take 1 tablet by mouth daily Historical Med      QUEtiapine (SEROQUEL) 50 MG tablet Take 150 mg by mouth daily Historical Med                Time Spent on discharge is more than 30 minutes in the examination, evaluation, counseling and review of medications and discharge plan. Signed:  Tram Hollis MD   3/5/2021      Thank you Chetan Moralez for the opportunity to be involved in this patient's care. If you have any questions or concerns please feel free to contact me at [phone].

## 2021-03-05 NOTE — PLAN OF CARE
Problem: Falls - Risk of:  Goal: Will remain free from falls  Description: Will remain free from falls  3/4/2021 2147 by Magy Fernández RN  Outcome: Ongoing  Note: Patient at risk for falls. Patient resting quietly in bed. Side rails up x 2. Bed locked in lowest position. Bed alarm on. Bedside table and call light within reach. Patient instructed to call for assistance. Patient verbalized understanding. Will continue to monitor.          Problem: Pain:  Goal: Pain level will decrease  Description: Pain level will decrease  3/4/2021 2147 by Magy Fernández RN  Outcome: Ongoing

## 2021-03-05 NOTE — CARE COORDINATION
Case Management Assessment            Discharge Note                    Date / Time of Note: 3/5/2021 12:12 PM                  Discharge Note Completed by: Guerita Noble    Patient Name: Meli Oneil   YOB: 1970  Diagnosis: NSTEMI (non-ST elevated myocardial infarction) [facility]) [I21.4]   Date / Time: 3/3/2021  1:32 PM    Current PCP: Verónica Verma patient: No    Hospitalization in the last 30 days: No    Advance Directives:  Code Status: Full Code  [place] DNR form completed and on chart: Not Indicated    Financial:  Payor: Natalio Kim / Plan: Ortega Breed / Product Type: *No Product type* /      Pharmacy:    [facility] [address] [place] [address] Rd, [address] [address]  Phone: [phone] Fax: [phone]      Assistance purchasing medications?: Potential Assistance Purchasing Medications: No  Assistance provided by Case Management: None at this time    Does patient want to participate in local refill/ meds to beds program?: No    Meds To Beds General Rules:  1. Can ONLY be done Monday- Friday between 8:30am-5pm  2. Prescription(s) must be in pharmacy by 3pm to be filled same day  3. Copy of patient's insurance/ prescription drug card and patient face sheet must be sent along with the prescription(s)  4. Cost of Rx cannot be added to hospital bill. If financial assistance is needed, please contact unit  or ;  or  CANNOT provide pharmacy voucher for patients co-pays  5.  Patients can then  the prescription on their way out of the hospital at discharge, or pharmacy can deliver to the bedside if staff is available. (payment due at time of pick-up or delivery - cash, check, or card accepted)     Able to afford home medications/ co-pay costs: Yes    ADLS:  Current PT AM-PAC Score:   /24  Current OT AM-PAC Score:   /24      DISCHARGE Disposition: Home- No Services Needed    LOC at discharge: Not Applicable  ZEENAT Completed: Not Indicated    Notification completed in HENS/PAS?:  Not Applicable    IMM Completed:   Not Indicated    Transportation:  Transportation PLAN for discharge: family   Mode of Transport: Private Car      Referrals made at [facility] for outpatient continued care:  Not Applicable    Additional CM Notes: referred to patient for possible d/c.  Spoke to patient. patient denies d/c needs. Patient lives at home with significant other. Patient reports she is independent in ADLs. The Plan for Transition of Care is related to the following treatment goals of NSTEMI (non-ST elevated myocardial infarction) ([facility] [I21.4]    The Patient and/or patient representative Ranjan Ramirez and her family were provided with a choice of provider and agrees with the discharge plan Not Indicated    Freedom of choice list was provided with basic dialogue that supports the patient's individualized plan of care/goals and shares the quality data associated with the providers.  Not Indicated    Care Transitions patient: No    NICOLE Payne, YENY-S  [facility]  Case Management Department  Ph: [phone]

## 2021-03-15 ENCOUNTER — OFFICE VISIT (OUTPATIENT)
Dept: CARDIOLOGY CLINIC | Age: 51
End: 2021-03-15
Payer: COMMERCIAL

## 2021-03-15 ENCOUNTER — PREP FOR PROCEDURE (OUTPATIENT)
Dept: CARDIOLOGY CLINIC | Age: 51
End: 2021-03-15

## 2021-03-15 VITALS
DIASTOLIC BLOOD PRESSURE: 70 MMHG | WEIGHT: 291.2 LBS | BODY MASS INDEX: 58.82 KG/M2 | TEMPERATURE: 96.9 F | HEART RATE: 101 BPM | SYSTOLIC BLOOD PRESSURE: 130 MMHG

## 2021-03-15 DIAGNOSIS — I20.8 OTHER FORMS OF ANGINA PECTORIS (HCC): ICD-10-CM

## 2021-03-15 DIAGNOSIS — I21.4 NSTEMI (NON-ST ELEVATED MYOCARDIAL INFARCTION) (HCC): Primary | ICD-10-CM

## 2021-03-15 DIAGNOSIS — R06.09 DYSPNEA ON EXERTION: ICD-10-CM

## 2021-03-15 PROCEDURE — 4004F PT TOBACCO SCREEN RCVD TLK: CPT | Performed by: INTERNAL MEDICINE

## 2021-03-15 PROCEDURE — G8484 FLU IMMUNIZE NO ADMIN: HCPCS | Performed by: INTERNAL MEDICINE

## 2021-03-15 PROCEDURE — G8417 CALC BMI ABV UP PARAM F/U: HCPCS | Performed by: INTERNAL MEDICINE

## 2021-03-15 PROCEDURE — 99214 OFFICE O/P EST MOD 30 MIN: CPT | Performed by: INTERNAL MEDICINE

## 2021-03-15 PROCEDURE — G8427 DOCREV CUR MEDS BY ELIG CLIN: HCPCS | Performed by: INTERNAL MEDICINE

## 2021-03-15 PROCEDURE — 1111F DSCHRG MED/CURRENT MED MERGE: CPT | Performed by: INTERNAL MEDICINE

## 2021-03-15 RX ORDER — SODIUM CHLORIDE 0.9 % (FLUSH) 0.9 %
10 SYRINGE (ML) INJECTION EVERY 12 HOURS SCHEDULED
Status: CANCELLED | OUTPATIENT
Start: 2021-03-15

## 2021-03-15 RX ORDER — SODIUM CHLORIDE 9 MG/ML
INJECTION, SOLUTION INTRAVENOUS CONTINUOUS
Status: CANCELLED | OUTPATIENT
Start: 2021-03-15

## 2021-03-15 RX ORDER — SODIUM CHLORIDE 0.9 % (FLUSH) 0.9 %
10 SYRINGE (ML) INJECTION PRN
Status: CANCELLED | OUTPATIENT
Start: 2021-03-15

## 2021-03-15 RX ORDER — ASPIRIN 325 MG
325 TABLET ORAL ONCE
Status: CANCELLED | OUTPATIENT
Start: 2021-03-23

## 2021-03-15 NOTE — PROGRESS NOTES
CC:  Chest pain and SOB with exertion    HPI:       Ran Dobbins is a 52 y. o. female Nanette LESLEY Guerrero who is morbidly obese, history of hypertension on propanolol at home, COPD, presented to ED with complaint of left shoulder pain, which started 3 weeks ago after a fall.  While in the ED she was noted to be short of breath on ambulation, heart rate going into 130s.  On further questioning she complained of chest tightness intermittent for the last few weeks sometimes at rest as well, along with endorsed bilateral lower external swelling worse at the end of the day.  EKG showed septal Q waves.  Initial troponin elevated 0.04.  Placed on Nitropaste and heparin drip, cardiology consulted who recommended admission for NSTEMI and transferred to [facility], [facility]..       Today, patient doing well. Denies chest pressure or leg pain. L shoulder continues to bother her with movement. Also complains of new mild headache, likely due to NTG. Her chest pressure usually comes on about 3 times per week, midsternal, can come on while at rest possibly during moments of anxiety, lasting anywhere between 15 min to 1 hr. Ibuprofen does not help, symptoms go away on their own. Pressure symptoms accompanied by diaphoresis. Now as outpt pt is having sob and chest tightness and smokes. bp is up and takes propanolol. She is concerned about CAD. Obese.       Histories      Past Medical History:   has a past medical history of Anxiety, COPD (chronic obstructive pulmonary disease) ([address] Degenerative disk disease, Depression, High blood pressure, and MRSA (methicillin resistant staph aureus) culture positive.     Surgical History:   has a past surgical history that includes Tubal ligation;  section; and fracture surgery.      Social History:   reports that she has been smoking cigarettes. She has been smoking about 1.50 packs per day.  She has never used smokeless tobacco. She reports that she does not drink alcohol or use drugs.      Family History:  No evidence for sudden cardiac death or premature CAD        Medications:         Home Medications  Were reviewed and are listed in nursing record. and/or listed below  Home Medications   Prior to Admission medications    Medication Sig Start Date End Date Taking?  Authorizing Provider   tiotropium (SPIRIVA) 18 MCG inhalation capsule Inhale into the lungs daily Inhale 2 puffs by inhalation daily     Yes Historical Provider, MD   ipratropium-albuterol (DUONEB) 0.5-2.5 (3) MG/3ML SOLN nebulizer solution Inhale 1 vial into the lungs every 4 hours     Yes Historical Provider, MD   ibuprofen (ADVIL;MOTRIN) 800 MG tablet Take 800 mg by mouth as needed for Pain     Yes Historical Provider, MD   escitalopram (LEXAPRO) 20 MG tablet Take 1 tablet by mouth daily      Yes Historical Provider, MD   propranolol (INDERAL) 10 MG tablet Take 1 tablet by mouth daily      Yes Historical Provider, MD   QUEtiapine (SEROQUEL) 50 MG tablet Take 150 mg by mouth daily      Yes Historical Provider, MD               Inpatient Medications:  Scheduled Medications    sodium chloride flush  10 mL Intravenous 2 times per day    aspirin  81 mg Oral Daily    atorvastatin  80 mg Oral Nightly    nitroglycerin  0.5 inch Topical 4 times per day    QUEtiapine  150 mg Oral Daily    tiotropium  2 puff Inhalation Daily    escitalopram  20 mg Oral Daily    albuterol  2.5 mg Nebulization Q4H WA    nicotine  1 patch Transdermal Daily            IV drips:  Infusions Meds    heparin (PORCINE) Infusion 11 Units/kg/hr (03/03/21 3327)            PRN:  PRN Medications   nicotine polacrilex, sodium chloride flush, promethazine **OR** ondansetron, acetaminophen **OR** acetaminophen, polyethylene glycol, perflutren lipid microspheres, heparin (porcine), heparin (porcine), diclofenac sodium, traMADol, ipratropium-albuterol, albuterol        Allergy:      Cephalexin, Codeine, and Vancomycin         Review of Systems:      All 12 point review of symptoms completed. Pertinent positives identified in the HPI, all other review of symptoms negative as below.     CONSTITUTIONAL: Nounanticipated weight loss. No change in energy level, sleep pattern, or activity level. SKIN: No rash or pruritis. EYES: No visual changes or diplopia. No scleral icterus. ENT: No Headaches, hearing loss or vertigo. No mouth sores or sore throat. CARDIOVASCULAR: No chest pain/chest pressure/chest discomfort. No palpitations. RESPIRATORY: No cough or wheezing, no sputum production. No hematemesis. GASTROINTESTINAL: No N/V/D. No abdominal pain, appetite loss, blood in stools. GENITOURINARY: No dysuria, trouble voiding, or hematuria. MUSCULOSKELETAL:  No gait disturbance, weakness or joint complaints. NEUROLOGICAL: No headache, diplopia, change in muscle strength, numbness or tingling. No change in gait, balance, coordination, mood, affect, memory, mentation, behavior. PSHYCH: No anxiety, loss of interest, change in sexual behavior, feelings of self-harm, or confusion. ENDOCRINE: No malaise, fatigue or temperature intolerance. No excessive thirst, fluid intake, or urination. No tremor. HEMATOLOGIC: No abnormal bruising or bleeding. ALLERGY: No nasal congestion or hives.        Physical Examination:      Vitals          Vitals:     03/04/21 0424 03/04/21 0429 03/04/21 0551 03/04/21 0750   BP: 107/65   122/78 133/77   Pulse: 93     95   Resp: 21     20   Temp: 97.6 °F (36.4 °C)     97.8 °F (36.6 °C)   TempSrc: Oral     Oral   SpO2: 93%     93%   Weight:   (!) 302 lb 4.8 oz (137.1 kg)                    Wt Readings from Last 3 Encounters:   03/04/21 (!) 302 lb 4.8 oz (137.1 kg)   09/21/20 245 lb (111.1 kg)   06/29/18 239 lb 3.2 oz (108.5 kg)         Objective:  General Appearance: In no acute distress and not in pain. Vital signs: (most recent): Blood pressure 130/70, pulse 99, temperature 97.8 °F (36.6 °C), temperature source Oral, resp.  rate 20, weight (!) 302 lb 4.8 oz (137.1 kg),   Lungs:  Increased effort. There are decreased breath sounds and wheezes. Heart: Tachycardia. rrr no S3 normal S1 and S2. Chest: No chest wall tenderness. Abdomen: Abdomen is soft. Bowel sounds are normal.     Extremities: trace edema and much improved. Skin:  Warm and dry.             Labs:           Recent Labs     03/03/21  1408 03/04/21  0620    136   K 4.1 4.9   BUN 7 9   CREATININE 0.7 0.8   CL 97* 99   CO2 31 30   GLUCOSE 153* 148*   CALCIUM 9.1 8.8            Recent Labs     03/03/21  1408 03/03/21 2009 03/04/21  0620   WBC 9.0 10.0 7.9   HGB 14.0 14.1 13.6   HCT 42.1 42.8 40.5    216 187   MCV 96.3 97.5 96.2      No results for input(s): CHOLTOT, TRIG, HDL in the last 72 hours.     Invalid input(s): LIPIDCOMM, CHOLHDL, VLDCHOL, LDL      Recent Labs     03/03/21  2009   INR 1.03             Recent Labs     03/03/21  1408 03/03/21  1800 03/03/21 2009 03/03/21  2222   TROPONINI 0.04* <0.01 <0.01 <0.01      No results for input(s): BNP in the last 72 hours. No results for input(s): TSH in the last 72 hours. No results for input(s): CHOL, HDL, LDLCALC, TRIG in the last 72 hours. ]           Lab Results   Component Value Date     TROPONINI <0.01 03/03/2021            Imaging:      I personally reviewed imaging studies including CXR, Stress test, TTE/LUCA.     Last ECG (if available) - EKG:  I have reviewed EKG with the following interpretation  NSR, septal Q waves     Telemetry:  NSR     Last Stress (if available):     Last TTE/LUCA(if available):  Echo 3/2019: The left ventricular wall motion is normal.    There is trace mitral regurgitation.     Last Cath (if available):     Last CMR  (if available):        Assessment / Plan:   NSTEMI recently  -  ASA 81, lipitor 80  - heparin gtt  - f/u Echo    Chest Pain:  The pt is having CP with exertion and SOB which may also   Normal right valerio's test.  Pt also has SOB. Will proceed with left heart cath. Her son has titin cardiomyopathy.       HTN  - BP currently controlled   - Continue home propanolol given COPD and dyspnea    Anxiety:  She was reluctant to have testing in the hospital due to separation anxiety but now has significant complaints with abnormal ECG with diffuse ST elevation and poor R wave progression right precordial leads. Will need to invasively risk stratify pt with coronary angiography from right wrist.  Morbidly obese.      Tobacco use was discussed with the patient and educated on the negative effects.  I have asked the patient to not utilize these agents.       Laquita Louis MD

## 2021-03-18 ENCOUNTER — OFFICE VISIT (OUTPATIENT)
Dept: PRIMARY CARE CLINIC | Age: 51
End: 2021-03-18
Payer: COMMERCIAL

## 2021-03-18 DIAGNOSIS — Z20.822 SUSPECTED COVID-19 VIRUS INFECTION: Primary | ICD-10-CM

## 2021-03-18 PROCEDURE — G8417 CALC BMI ABV UP PARAM F/U: HCPCS | Performed by: NURSE PRACTITIONER

## 2021-03-18 PROCEDURE — G8428 CUR MEDS NOT DOCUMENT: HCPCS | Performed by: NURSE PRACTITIONER

## 2021-03-18 PROCEDURE — 99211 OFF/OP EST MAY X REQ PHY/QHP: CPT | Performed by: NURSE PRACTITIONER

## 2021-03-18 NOTE — PROGRESS NOTES
Ciro Campos received a viral test for COVID-19. They were educated on isolation and quarantine as appropriate. For any symptoms, they were directed to seek care from their PCP, given contact information to establish with a doctor, directed to an urgent care or the emergency room.

## 2021-03-19 LAB — SARS-COV-2: NOT DETECTED

## 2021-03-22 NOTE — PRE-PROCEDURE INSTRUCTIONS
Called patient about procedure. Told to be here at 0830 for procedure at 1000. NPO after midnight, but can take morning medication with sips of water, patient stated they are not on any blood thinners. To have a responsible adult be with patient take them home and stay with them afterwards, if they do not get admitted to [address] [place] [address]. And if available bring current list of medications. No other questions or concerns.

## 2021-03-23 ENCOUNTER — HOSPITAL ENCOUNTER (INPATIENT)
Dept: CARDIAC CATH/INVASIVE PROCEDURES | Age: 51
LOS: 1 days | Discharge: HOME | DRG: 174 | End: 2021-03-24
Attending: INTERNAL MEDICINE | Admitting: INTERNAL MEDICINE
Payer: COMMERCIAL

## 2021-03-23 DIAGNOSIS — Z98.61 CAD S/P PERCUTANEOUS CORONARY ANGIOPLASTY: ICD-10-CM

## 2021-03-23 DIAGNOSIS — I25.10 CAD S/P PERCUTANEOUS CORONARY ANGIOPLASTY: ICD-10-CM

## 2021-03-23 LAB
A/G RATIO: 1.1 (ref 1.1–2.2)
ALBUMIN SERPL-MCNC: 3.9 G/DL (ref 3.4–5)
ALP BLD-CCNC: 95 U/L (ref 40–129)
ALT SERPL-CCNC: 19 U/L (ref 10–40)
ANION GAP SERPL CALCULATED.3IONS-SCNC: 11 MMOL/L (ref 3–16)
AST SERPL-CCNC: 30 U/L (ref 15–37)
BILIRUB SERPL-MCNC: 0.4 MG/DL (ref 0–1)
BUN BLDV-MCNC: 10 MG/DL (ref 7–20)
CALCIUM SERPL-MCNC: 9.3 MG/DL (ref 8.3–10.6)
CHLORIDE BLD-SCNC: 99 MMOL/L (ref 99–110)
CO2: 25 MMOL/L (ref 21–32)
CREAT SERPL-MCNC: 0.7 MG/DL (ref 0.6–1.1)
EKG ATRIAL RATE: 97 BPM
EKG DIAGNOSIS: NORMAL
EKG P AXIS: 41 DEGREES
EKG P-R INTERVAL: 140 MS
EKG Q-T INTERVAL: 350 MS
EKG QRS DURATION: 72 MS
EKG QTC CALCULATION (BAZETT): 444 MS
EKG R AXIS: 39 DEGREES
EKG T AXIS: 65 DEGREES
EKG VENTRICULAR RATE: 97 BPM
GFR AFRICAN AMERICAN: >60
GFR NON-AFRICAN AMERICAN: >60
GLOBULIN: 3.6 G/DL
GLUCOSE BLD-MCNC: 152 MG/DL (ref 70–99)
HCT VFR BLD CALC: 45.7 % (ref 36–48)
HCT VFR BLD CALC: 46 % (ref 36–48)
HEMOGLOBIN: 15 G/DL (ref 12–16)
HEMOGLOBIN: 15.2 G/DL (ref 12–16)
INR BLD: 1.3 (ref 0.86–1.14)
LEFT VENTRICULAR EJECTION FRACTION MODE: NORMAL
LV EF: 60 %
MCH RBC QN AUTO: 32.2 PG (ref 26–34)
MCH RBC QN AUTO: 32.3 PG (ref 26–34)
MCHC RBC AUTO-ENTMCNC: 32.7 G/DL (ref 31–36)
MCHC RBC AUTO-ENTMCNC: 33.3 G/DL (ref 31–36)
MCV RBC AUTO: 96.8 FL (ref 80–100)
MCV RBC AUTO: 99 FL (ref 80–100)
PDW BLD-RTO: 16.5 % (ref 12.4–15.4)
PDW BLD-RTO: 16.6 % (ref 12.4–15.4)
PLATELET # BLD: 236 K/UL (ref 135–450)
PLATELET # BLD: 244 K/UL (ref 135–450)
PMV BLD AUTO: 9.6 FL (ref 5–10.5)
PMV BLD AUTO: 9.9 FL (ref 5–10.5)
POC ACT LR: 338 SEC
POTASSIUM SERPL-SCNC: 4.9 MMOL/L (ref 3.5–5.1)
RBC # BLD: 4.64 M/UL (ref 4–5.2)
RBC # BLD: 4.72 M/UL (ref 4–5.2)
SODIUM BLD-SCNC: 135 MMOL/L (ref 136–145)
TOTAL PROTEIN: 7.5 G/DL (ref 6.4–8.2)
WBC # BLD: 11.8 K/UL (ref 4–11)
WBC # BLD: 11.9 K/UL (ref 4–11)

## 2021-03-23 PROCEDURE — 99153 MOD SED SAME PHYS/QHP EA: CPT

## 2021-03-23 PROCEDURE — 80061 LIPID PANEL: CPT

## 2021-03-23 PROCEDURE — 99223 1ST HOSP IP/OBS HIGH 75: CPT | Performed by: INTERNAL MEDICINE

## 2021-03-23 PROCEDURE — 85027 COMPLETE CBC AUTOMATED: CPT

## 2021-03-23 PROCEDURE — 027034Z DILATION OF CORONARY ARTERY, ONE ARTERY WITH DRUG-ELUTING INTRALUMINAL DEVICE, PERCUTANEOUS APPROACH: ICD-10-PCS | Performed by: INTERNAL MEDICINE

## 2021-03-23 PROCEDURE — 92928 PRQ TCAT PLMT NTRAC ST 1 LES: CPT | Performed by: INTERNAL MEDICINE

## 2021-03-23 PROCEDURE — 94640 AIRWAY INHALATION TREATMENT: CPT

## 2021-03-23 PROCEDURE — B2111ZZ FLUOROSCOPY OF MULTIPLE CORONARY ARTERIES USING LOW OSMOLAR CONTRAST: ICD-10-PCS | Performed by: INTERNAL MEDICINE

## 2021-03-23 PROCEDURE — C1887 CATHETER, GUIDING: HCPCS

## 2021-03-23 PROCEDURE — 36415 COLL VENOUS BLD VENIPUNCTURE: CPT

## 2021-03-23 PROCEDURE — 2709999900 HC NON-CHARGEABLE SUPPLY

## 2021-03-23 PROCEDURE — 6370000000 HC RX 637 (ALT 250 FOR IP): Performed by: STUDENT IN AN ORGANIZED HEALTH CARE EDUCATION/TRAINING PROGRAM

## 2021-03-23 PROCEDURE — 93010 ELECTROCARDIOGRAM REPORT: CPT | Performed by: INTERNAL MEDICINE

## 2021-03-23 PROCEDURE — C1769 GUIDE WIRE: HCPCS

## 2021-03-23 PROCEDURE — 2580000003 HC RX 258: Performed by: INTERNAL MEDICINE

## 2021-03-23 PROCEDURE — 2500000003 HC RX 250 WO HCPCS

## 2021-03-23 PROCEDURE — C1874 STENT, COATED/COV W/DEL SYS: HCPCS

## 2021-03-23 PROCEDURE — 6360000002 HC RX W HCPCS

## 2021-03-23 PROCEDURE — 4A023N7 MEASUREMENT OF CARDIAC SAMPLING AND PRESSURE, LEFT HEART, PERCUTANEOUS APPROACH: ICD-10-PCS | Performed by: INTERNAL MEDICINE

## 2021-03-23 PROCEDURE — 6370000000 HC RX 637 (ALT 250 FOR IP): Performed by: INTERNAL MEDICINE

## 2021-03-23 PROCEDURE — 6370000000 HC RX 637 (ALT 250 FOR IP)

## 2021-03-23 PROCEDURE — 2060000000 HC ICU INTERMEDIATE R&B

## 2021-03-23 PROCEDURE — 99152 MOD SED SAME PHYS/QHP 5/>YRS: CPT

## 2021-03-23 PROCEDURE — 92928 PRQ TCAT PLMT NTRAC ST 1 LES: CPT

## 2021-03-23 PROCEDURE — 85347 COAGULATION TIME ACTIVATED: CPT

## 2021-03-23 PROCEDURE — C1894 INTRO/SHEATH, NON-LASER: HCPCS

## 2021-03-23 PROCEDURE — 85610 PROTHROMBIN TIME: CPT

## 2021-03-23 PROCEDURE — 6360000002 HC RX W HCPCS: Performed by: INTERNAL MEDICINE

## 2021-03-23 PROCEDURE — 93005 ELECTROCARDIOGRAM TRACING: CPT | Performed by: INTERNAL MEDICINE

## 2021-03-23 PROCEDURE — 93458 L HRT ARTERY/VENTRICLE ANGIO: CPT | Performed by: INTERNAL MEDICINE

## 2021-03-23 PROCEDURE — 80053 COMPREHEN METABOLIC PANEL: CPT

## 2021-03-23 PROCEDURE — 93458 L HRT ARTERY/VENTRICLE ANGIO: CPT

## 2021-03-23 RX ORDER — IPRATROPIUM BROMIDE AND ALBUTEROL SULFATE 2.5; .5 MG/3ML; MG/3ML
1 SOLUTION RESPIRATORY (INHALATION) EVERY 4 HOURS PRN
Status: DISCONTINUED | OUTPATIENT
Start: 2021-03-23 | End: 2021-03-24 | Stop reason: HOSPADM

## 2021-03-23 RX ORDER — MORPHINE SULFATE 2 MG/ML
2 INJECTION, SOLUTION INTRAMUSCULAR; INTRAVENOUS
Status: ACTIVE | OUTPATIENT
Start: 2021-03-23 | End: 2021-03-23

## 2021-03-23 RX ORDER — SODIUM CHLORIDE 9 MG/ML
INJECTION, SOLUTION INTRAVENOUS CONTINUOUS
Status: ACTIVE | OUTPATIENT
Start: 2021-03-23 | End: 2021-03-23

## 2021-03-23 RX ORDER — ATROPINE SULFATE 0.4 MG/ML
0.5 AMPUL (ML) INJECTION
Status: ACTIVE | OUTPATIENT
Start: 2021-03-23 | End: 2021-03-23

## 2021-03-23 RX ORDER — SODIUM CHLORIDE 0.9 % (FLUSH) 0.9 %
10 SYRINGE (ML) INJECTION PRN
Status: DISCONTINUED | OUTPATIENT
Start: 2021-03-23 | End: 2021-03-23 | Stop reason: SDUPTHER

## 2021-03-23 RX ORDER — ESCITALOPRAM OXALATE 20 MG/1
20 TABLET ORAL DAILY
Status: DISCONTINUED | OUTPATIENT
Start: 2021-03-23 | End: 2021-03-24 | Stop reason: HOSPADM

## 2021-03-23 RX ORDER — SODIUM CHLORIDE 0.9 % (FLUSH) 0.9 %
10 SYRINGE (ML) INJECTION EVERY 12 HOURS SCHEDULED
Status: DISCONTINUED | OUTPATIENT
Start: 2021-03-23 | End: 2021-03-23 | Stop reason: SDUPTHER

## 2021-03-23 RX ORDER — ONDANSETRON 2 MG/ML
4 INJECTION INTRAMUSCULAR; INTRAVENOUS EVERY 6 HOURS PRN
Status: DISCONTINUED | OUTPATIENT
Start: 2021-03-23 | End: 2021-03-24 | Stop reason: HOSPADM

## 2021-03-23 RX ORDER — FUROSEMIDE 20 MG/1
20 TABLET ORAL DAILY
Status: DISCONTINUED | OUTPATIENT
Start: 2021-03-23 | End: 2021-03-24 | Stop reason: HOSPADM

## 2021-03-23 RX ORDER — ATORVASTATIN CALCIUM 40 MG/1
40 TABLET, FILM COATED ORAL NIGHTLY
Status: DISCONTINUED | OUTPATIENT
Start: 2021-03-23 | End: 2021-03-24 | Stop reason: HOSPADM

## 2021-03-23 RX ORDER — NICOTINE 21 MG/24HR
1 PATCH, TRANSDERMAL 24 HOURS TRANSDERMAL DAILY
Status: DISCONTINUED | OUTPATIENT
Start: 2021-03-23 | End: 2021-03-24 | Stop reason: HOSPADM

## 2021-03-23 RX ORDER — ASPIRIN 325 MG
325 TABLET ORAL ONCE
Status: DISCONTINUED | OUTPATIENT
Start: 2021-03-23 | End: 2021-03-24 | Stop reason: HOSPADM

## 2021-03-23 RX ORDER — IPRATROPIUM BROMIDE AND ALBUTEROL SULFATE 2.5; .5 MG/3ML; MG/3ML
1 SOLUTION RESPIRATORY (INHALATION) EVERY 4 HOURS
Status: DISCONTINUED | OUTPATIENT
Start: 2021-03-23 | End: 2021-03-23

## 2021-03-23 RX ORDER — SODIUM CHLORIDE 0.9 % (FLUSH) 0.9 %
10 SYRINGE (ML) INJECTION EVERY 12 HOURS SCHEDULED
Status: DISCONTINUED | OUTPATIENT
Start: 2021-03-23 | End: 2021-03-24 | Stop reason: HOSPADM

## 2021-03-23 RX ORDER — SODIUM CHLORIDE 0.9 % (FLUSH) 0.9 %
10 SYRINGE (ML) INJECTION PRN
Status: DISCONTINUED | OUTPATIENT
Start: 2021-03-23 | End: 2021-03-24 | Stop reason: HOSPADM

## 2021-03-23 RX ORDER — MORPHINE SULFATE 2 MG/ML
2 INJECTION, SOLUTION INTRAMUSCULAR; INTRAVENOUS ONCE
Status: COMPLETED | OUTPATIENT
Start: 2021-03-23 | End: 2021-03-23

## 2021-03-23 RX ORDER — METOPROLOL SUCCINATE 25 MG/1
25 TABLET, EXTENDED RELEASE ORAL DAILY
Status: DISCONTINUED | OUTPATIENT
Start: 2021-03-23 | End: 2021-03-24 | Stop reason: HOSPADM

## 2021-03-23 RX ORDER — ACETAMINOPHEN 325 MG/1
650 TABLET ORAL EVERY 4 HOURS PRN
Status: DISCONTINUED | OUTPATIENT
Start: 2021-03-23 | End: 2021-03-24 | Stop reason: HOSPADM

## 2021-03-23 RX ORDER — SODIUM CHLORIDE 9 MG/ML
INJECTION, SOLUTION INTRAVENOUS CONTINUOUS
Status: DISCONTINUED | OUTPATIENT
Start: 2021-03-23 | End: 2021-03-24 | Stop reason: HOSPADM

## 2021-03-23 RX ORDER — CLOPIDOGREL BISULFATE 75 MG/1
75 TABLET ORAL DAILY
Status: DISCONTINUED | OUTPATIENT
Start: 2021-03-24 | End: 2021-03-24 | Stop reason: HOSPADM

## 2021-03-23 RX ORDER — ASPIRIN 325 MG
325 TABLET ORAL DAILY
Status: DISCONTINUED | OUTPATIENT
Start: 2021-03-24 | End: 2021-03-24 | Stop reason: HOSPADM

## 2021-03-23 RX ORDER — LISINOPRIL 5 MG/1
5 TABLET ORAL DAILY
Status: DISCONTINUED | OUTPATIENT
Start: 2021-03-23 | End: 2021-03-24 | Stop reason: HOSPADM

## 2021-03-23 RX ADMIN — METOPROLOL SUCCINATE 25 MG: 25 TABLET, EXTENDED RELEASE ORAL at 14:50

## 2021-03-23 RX ADMIN — Medication 10 ML: at 20:00

## 2021-03-23 RX ADMIN — ATORVASTATIN CALCIUM 40 MG: 40 TABLET, FILM COATED ORAL at 19:58

## 2021-03-23 RX ADMIN — TIROFIBAN 0.15 MCG/KG/MIN: 5 INJECTION, SOLUTION INTRAVENOUS at 11:00

## 2021-03-23 RX ADMIN — MORPHINE SULFATE 2 MG: 2 INJECTION, SOLUTION INTRAMUSCULAR; INTRAVENOUS at 13:54

## 2021-03-23 RX ADMIN — ACETAMINOPHEN 650 MG: 325 TABLET ORAL at 19:58

## 2021-03-23 RX ADMIN — SODIUM CHLORIDE: 9 INJECTION, SOLUTION INTRAVENOUS at 16:41

## 2021-03-23 RX ADMIN — IPRATROPIUM BROMIDE AND ALBUTEROL SULFATE 3 ML: .5; 2.5 SOLUTION RESPIRATORY (INHALATION) at 17:31

## 2021-03-23 ASSESSMENT — PAIN SCALES - GENERAL
PAINLEVEL_OUTOF10: 9
PAINLEVEL_OUTOF10: 8

## 2021-03-23 ASSESSMENT — PAIN DESCRIPTION - ONSET
ONSET: ON-GOING
ONSET: ON-GOING

## 2021-03-23 ASSESSMENT — PAIN DESCRIPTION - ORIENTATION
ORIENTATION: LEFT
ORIENTATION: LEFT

## 2021-03-23 ASSESSMENT — PAIN DESCRIPTION - LOCATION
LOCATION: SHOULDER
LOCATION: SHOULDER

## 2021-03-23 ASSESSMENT — PAIN DESCRIPTION - PAIN TYPE
TYPE: ACUTE PAIN;CHRONIC PAIN
TYPE: CHRONIC PAIN

## 2021-03-23 ASSESSMENT — PAIN DESCRIPTION - PROGRESSION: CLINICAL_PROGRESSION: NOT CHANGED

## 2021-03-23 ASSESSMENT — PAIN DESCRIPTION - FREQUENCY: FREQUENCY: CONTINUOUS

## 2021-03-23 ASSESSMENT — PAIN DESCRIPTION - DESCRIPTORS: DESCRIPTORS: ACHING

## 2021-03-23 NOTE — PROCEDURES
[facility] [address] [address], [address]                            CARDIAC CATHETERIZATION    PATIENT NAME: Angela Aguila                    :        1970  MED REC NO:   [id number]                          ROOM:  ACCOUNT NO:   [de-identified]                           ADMIT DATE: 2021  PROVIDER:     Rosemarie Richter. Gabriel Richardson MD    DATE OF PROCEDURE:  2021    CARDIAC CATHETERIZATION, PERCUTANEOUS CORONARY INTERVENTION, LEFT  VENTRICULOGRAPHY, AND LEFT VENTRICULAR PRESSURES    INDICATION FOR PROCEDURE:  This patient was recently admitted to the  hospital with chest pains. She had mildly elevated troponin at that  time at 0.04. She was not amenable to any angiography study or  procedure to assess her coronary anatomy, she just was interested in  discharge. She came to see me in the office and was complaining of  chest pain and shortness of breath. She is an obese patient, 4 feet 11  inches, 291 pounds, and it seems unlikely that we will get a reasonable  nuclear scan. Her EKG is abnormal.  She has a history in her family of  Titin cardiomyopathy; as a result of these, various problems including  her crescendo angina and shortness of breath that could be an anginal  equivalent. She presents today for invasive risk stratification with  coronary angiography. The Zeke's test was within normal limits in the  right wrist.    Prior to the angiogram the Mallampati and ASA scores were assessed as follows: Mallampati 2. ASA 2.      DESCRIPTION OF PROCEDURE:  Prepped and draped in sterile manner. Locally anesthetized with 1% lidocaine in the right radial region. A  5/6-Thai Slender sheath was placed in the right radial artery using  modified Seldinger technique. It was aspirated and flushed and then the  usual radial cocktail was given through the sheath.   The start time of  the procedure was 10:06 a.m., the stop time was 10: 49 a.m. There was a  total of 4 mg of Versed and 250 mcg of fentanyl given in divided doses  during the course of the procedure. A JL3.5 and a JR4 catheter were used during the diagnostic procedure. All were aspirated and flushed prior to use. All catheters were  advanced under fluoroscopic guidance over a guidewire and retracted over  a guidewire. FINDINGS:  We started with the right coronary artery. The JR4 catheter  engaged to right coronary artery and was going into an RV branch. Injection showed a 70% proximal right coronary artery stenosis. The  high take off right ventricular branch was angiographically  unremarkable, comes off at the ostium of the right coronary artery. A JL3.5 catheter was used to engage the left main coronary artery. Injection shows the left main coronary artery is normal.  The left  circumflex coronary artery has a 10% to 20% narrowing at the ostium. There is ARNOLD-3 blood flow in the left circumflex coronary artery. There is a ramus intermedius branch that is angiographically normal and  divides into superior and inferior branches. There is ARNOLD-3 blood flow  in the left circumflex and ramus intermedius. The LAD has tortuosity,  it was angiographically normal with no obstructive disease. The JR4  catheter was used to engage the right ventricle before injection to the  right coronary artery. Injection showed LV ejection fraction of 65%. The LVEDP was 18 mmHg. There was heavy respiratory variation. There  was no gradient across the aortic valve. There was no mitral  regurgitation seen. It was elected to proceed with PCI upon the right coronary artery. I  chose a [address] guide and the [address] guide engaged. There was significant  dampening of pressure, indicating that this proximal right coronary  artery stenosis was high-grade as well. I elected to stent this lesion  primarily. 9000 units of heparin was given intravenously.   A [employer]  guidewire was advanced beyond the 75% stenosis and the proximal right  coronary artery was primarily stented with a 3.25 mm diameter x 18 mm  length Xience Dolores drug-eluting stent. The stent was deployed at 17  atmospheres x35 seconds. There was ARNOLD-3 blood flow with nearly 0%  residual stenosis. There was no further dampening of pressure with the  guide. The patient started having some mild symptoms suggesting radial  vasospasm, and the angioplasty procedure was concluded. I did inject an additional 100 mcg of nitroglycerin into the right  radial sheath after the guide was removed. The patient tolerated the  procedure well. The ACT was 335 seconds. I elected to use Aggrastat, 600 mg of Plavix  was given. CONCLUSION:  1. Successful primary stent of the proximal right coronary artery with  a 3.25 x 18 mm Xience Dolores drug-eluting stent with ARNOLD grade-3 flow  and 0% residual stenosis. There was excellent reflux of contrast at the  conclusion of the procedure. 2.  LV ejection fraction 65%, 10% to 20% narrowing of the ostium at the  circumflex coronary artery. PLAN:  Hydration, bedrest, risk factor modification, sheath will be  removed in the holding bay, observation overnight and hopefully  discharge tomorrow. Continue risk factor modification and hydration. There were no complications noted.     EBL < 15 mL        Yunior Davis MD    D: 03/23/2021 11:23:34       T: 03/23/2021 12:47:54     [email or web address]  Job#: [id number]     Doc#: [id number]    CC:  Lilia Ganser, MD

## 2021-03-23 NOTE — H&P
[facility]     Reason for Consultation/Chief Complaint: \"I have been having chest pain and SOB w/ exertion. \"       History of Present Illness:  Simon Barber a 52 y. o. female Nanette Guerrero who is morbidly obese, history of hypertension on propanolol at home, COPD, initially presented to ED w/ c/o left shoulder pain, which started 4 weeks ago after a fall. In the ED she was noted to be short of breath on ambulation and tachycardia w/ associated chest tightness intermittently for the last few weeks, occ at rest along with worsening BLE swelling worse at the end of the day. EKG at that time showed septal Q waves w/ mild troponinemia, NSTEMI after which she was started on hep gtt and nitroglycerin and was eventually discharged. In office, she was still having L shoulder pain w/ movement, mild HA, and ongoing intermittent chest pressure associated w/ anxiety and diaphoresis and continuing shortness of breath with elevated BP. EKG at that time showed diffuse ST elevation and poor R wave progression in R precordial leads and decision was made to risk stratify w/ coronary angiogram from right wrist.    Risk factors: HTN, HLD Obesity, Smoker, son w/ titin cardiomyopathy  . Past Medical History:   has a past medical history of Anxiety, COPD (chronic obstructive pulmonary disease) ([address] Degenerative disk disease, Depression, High blood pressure, and MRSA (methicillin resistant staph aureus) culture positive. Surgical History:   has a past surgical history that includes Tubal ligation;  section; and fracture surgery. Social History:   reports that she has been smoking cigarettes. She has been smoking about 1.50 packs per day. She has never used smokeless tobacco. She reports that she does not drink alcohol or use drugs. Family History:  No evidence for sudden cardiac death or premature CAD    Home Medications:  Were reviewed and are listed in nursing record.  and/or listed below  Prior to Admission medications    Medication Sig Start Date End Date Taking? Authorizing Provider   aspirin EC 81 MG EC tablet Take 1 tablet by mouth daily 3/5/21  Yes Mary Lay MD   atorvastatin (LIPITOR) 40 MG tablet Take 1 tablet by mouth daily 3/5/21  Yes Mary Lay MD   lisinopril (PRINIVIL;ZESTRIL) 5 MG tablet Take 1 tablet by mouth daily 3/6/21  Yes Mary Lay MD   furosemide (LASIX) 20 MG tablet Take 1 tablet by mouth daily 3/5/21  Yes Mary Lay MD   tiotropium (SPIRIVA) 18 MCG inhalation capsule Inhale into the lungs daily Inhale 2 puffs by inhalation daily   Yes Historical Provider, MD   ibuprofen (ADVIL;MOTRIN) 800 MG tablet Take 800 mg by mouth as needed for Pain   Yes Historical Provider, MD   escitalopram (LEXAPRO) 20 MG tablet Take 1 tablet by mouth daily    Yes Historical Provider, MD   propranolol (INDERAL) 10 MG tablet Take 1 tablet by mouth daily    Yes Historical Provider, MD   QUEtiapine (SEROQUEL) 50 MG tablet Take 150 mg by mouth daily    Yes Historical Provider, MD   diclofenac sodium (VOLTAREN) 1 % GEL Apply 4 g topically 4 times daily as needed for Pain 3/5/21   Mary Lay MD   nicotine (NICODERM CQ) 21 MG/24HR Place 1 patch onto the skin daily 3/6/21   Mary Lay MD   ipratropium-albuterol (DUONEB) 0.5-2.5 (3) MG/3ML SOLN nebulizer solution Inhale 1 vial into the lungs every 4 hours    Historical Provider, MD        Hospital Medications:   aspirin  325 mg Oral Once    sodium chloride flush  10 mL Intravenous 2 times per day     sodium chloride flush   sodium chloride         Allergies:  Cephalexin, Codeine, and Vancomycin     Review of Systems:   All 12 point review of symptoms completed. Pertinent positives identified in the HPI, all other review of symptoms negative as below. CONSTITUTIONAL: Nounanticipated weight loss. No change in energy level, sleep pattern, or activity level.     SKIN: No rash or pruritis. EYES: No visual changes or diplopia. No scleral icterus. ENT: No Headaches, hearing loss or vertigo. No mouth sores or sore throat. CARDIOVASCULAR: No chest pain/chest pressure/chest discomfort. No palpitations. RESPIRATORY: No cough or wheezing, no sputum production. No hematemesis.    GASTROINTESTINAL: No N/V/D. No abdominal pain, appetite loss, blood in stools. GENITOURINARY: No dysuria, trouble voiding, or hematuria. MUSCULOSKELETAL:  No gait disturbance, weakness or joint complaints. NEUROLOGICAL: No headache, diplopia, change in muscle strength, numbness or tingling. No change in gait, balance, coordination, mood, affect, memory, mentation, behavior. PSHYCH: No anxiety, loss of interest, change in sexual behavior, feelings of self-harm, or confusion. ENDOCRINE: No malaise, fatigue or temperature intolerance. No excessive thirst, fluid intake, or urination. No tremor. HEMATOLOGIC: No abnormal bruising or bleeding. ALLERGY: No nasal congestion or hives. Physical Examination:    There were no vitals filed for this visit. Weight: 291 lb 0.1 oz (132 kg)         General Appearance:  Alert, cooperative, no distress, appears stated age   Head:  Normocephalic, without obvious abnormality, atraumatic   Eyes:  PERRL   Nose: Nares normal,   Neck: Supple, JVP normal   Lungs:   Decreased breath sounds bilaterally mildly inc effort   Chest Wall:  No tenderness or deformity   Heart:  Tachycardic, S1, S2 normal, no S3, no murmur, rub or gallop   Abdomen:   Soft, non-tender, +bowel sounds   Extremities: no cyanosis, no clubbing, trace edema   Pulses: Symmetric, R valerio's test normal   Skin:  no gross lesions   Pysch: Normal mood and affect   Neurologic: No gross deficits.         Labs  CBC:   Lab Results   Component Value Date    WBC 11.8 03/23/2021    RBC 4.72 03/23/2021    HGB 15.2 03/23/2021    HCT 45.7 03/23/2021    MCV 96.8 03/23/2021    RDW 16.6 03/23/2021     03/23/2021     CMP:    Lab Results   Component Value Date     03/23/2021 K 4.9 03/23/2021    K 4.9 03/04/2021    CL 99 03/23/2021    CO2 25 03/23/2021    BUN 10 03/23/2021    CREATININE 0.7 03/23/2021    GFRAA >60 03/23/2021    AGRATIO 1.1 03/23/2021    LABGLOM >60 03/23/2021    GLUCOSE 152 03/23/2021    PROT 7.5 03/23/2021    CALCIUM 9.3 03/23/2021    BILITOT 0.4 03/23/2021    ALKPHOS 95 03/23/2021    AST 30 03/23/2021    ALT 19 03/23/2021     PT/INR:  No results found for: PTINR  No results for input(s): CKTOTAL, CKMB, TROPONINI in the last 72 hours. EKG:  NSR, diffuse ST elevation and poor R wave progression in R precordial leads    Assessment  Patient Active Problem List   Diagnosis    NSTEMI (non-ST elevated myocardial infarction) (HCC)    Dyspnea on exertion    Morbid (severe) obesity due to excess calories ([address]    Family history of heart failure, positiive TITIN GENE in the family        Impression:  51F PMH NSTEMI, HTN, COPD, Anxiety, smoker presented for scheduled risk stratification w/ coronary angiogram, found to have 70% proximal stenosis of RCA CAD s/p RCA DESx1     CAD, rcnt NSTEMI  HTN  HLD  COPD  Anxiety  Morbid Obesity    Recommendations:    CAD c/b NSTEMI s/p proximal RCA  Xience CORI x1 w/ plavix load, LVEF 65%  Aggrastat gtt 6hrs  R TR band care per nursing, sheath to be removed in obs bay  Start plavix 75  Cont , atorvastatin 40  Start toprol 25  Continuous tele monitoring  Short term mIVF 125/hr post cath  Bedrest 3 hrs  Labs in AM (CBC, BMP)    HTN, holding lasix post cath, BB as above, cont lisinopril 5    HLD on statin as above    BLE Swelling and Shortness of breath  Lasix held post cath, can restart on DC    COPD w/o acute exacerbation  Cont home breathing treatments add PRN    Nicotine Dependence  Counseled on cessation  Nicotine patch    Depression/Anxiety on lexapro, seroquel    Morbid obesity due to excess calories Body mass index is 23.00 kg/m². - Complicating assessment and treatment.  Placing patient at risk for multiple co-morbidities as well as early death and contributing to the patient's presentation.  Counseled on weight loss    Full Code  DIET CARDIAC; Admitted to Cardiology service for overnight observation, presumptive discharge tomorrow AM.    I had the opportunity to review the clinical symptoms and presentation of Oneida Unger. Tobacco use was discussed with the patient and educated on the negative effects. I have asked the patient to not utilize these agents. Thank you for allowing to us to participate in the care or Oneida Unger. All questions and concerns were addressed to the patient/family. Alternatives to my treatment were discussed. The note was completed using EMR. Every effort was made to ensure accuracy; however, inadvertent computerized transcription errors may be present. Urvashi Reardon MD   PGY-3    DWA Dr. Neldon Dance      Staff Note      Patient seen and evaluated with the medical resident. I was physically present during the critical portions of the service when performed by the resident including the assessment and management of the patient. I agree with the findings and plans as described.

## 2021-03-23 NOTE — PROGRESS NOTES
RESPIRATORY THERAPY ASSESSMENT    Name:  [address] Record Number:  [id number]  Age: 52 y.o. Gender: female  : 1970  Today's Date:  3/23/2021  Room:  [address] [place] [address]    Assessment     Is the patient being admitted for a COPD or Asthma exacerbation? No   (If yes the patient will be seen every 4 hours for the first 24 hours and then reassessed)    Patient Admission Diagnosis      Allergies  Allergies   Allergen Reactions    Cephalexin Anaphylaxis    Codeine      Tylenol #3      Vancomycin      Elevates creatnine. Pulmonary History:COPD  Home Oxygen Therapy:  room air  Home Respiratory Therapy:Albuterol/Ipratropium Bromide HHN and Tiotropium Bromide   Current Respiratory Therapy:  Spiriva, duoneb  Treatment Type: HHN  Medications: Albuterol/Ipratropium    Respiratory Severity Index(RSI)   Patients with orders for inhalation medications, oxygen, or any therapeutic treatment modality will be placed on Respiratory Protocol. They will be assessed with the first treatment and at least every 72 hours thereafter. The following severity scale will be used to determine frequency of treatment intervention.     Smoking History: Pulmonary Disease or Smoking History, Greater than 15 pack year = 2    Social History  Social History     Tobacco Use    Smoking status: Current Every Day Smoker     Packs/day: 1.50     Types: Cigarettes    Smokeless tobacco: Never Used   Substance Use Topics    Alcohol use: No    Drug use: No       Recent Surgical History: None = 0  Past Surgical History  Past Surgical History:   Procedure Laterality Date     SECTION      FRACTURE SURGERY      rods and plate in left foot    TUBAL LIGATION         Level of Consciousness: Alert, Oriented, and Cooperative = 0    Level of Activity: Walking unassisted = 0    Respiratory Pattern: Dyspnea with exertion;Irregular pattern;or RR less than 6 = 2    Breath Sounds: Diminshed bilaterally and/or crackles = 2    Sputum ,  , Sputum How Obtained: Cough on request  Cough: Strong, spontaneous, non-productive = 0    Vital Signs   BP (!) 142/84   Pulse 91   Temp 97.7 °F (36.5 °C) (Oral)   Resp 18   Ht 4' 11\" (1.499 m)   Wt 291 lb 0.1 oz (132 kg)   LMP 02/28/2021   SpO2 93%   BMI 58.78 kg/m²   SPO2 (COPD values may differ): Greater than or equal to 92% on room air = 0    Peak Flow (asthma only): not applicable = 0    RSI: 5-6 = Q4hr PRN (every four hours as needed) for dyspnea        Plan       Goals: medication delivery    Patient/caregiver was educated on the proper method of use for Respiratory Care Devices:  Yes      Level of patient/caregiver understanding able to:   ? Verbalize understanding   ? Demonstrate understanding       ? Teach back        ? Needs reinforcement       ? No available caregiver               ? Other:     Response to education:  Very Good     Is patient being placed on Home Treatment Regimen? Yes     Does the patient have everything they need prior to discharge? Yes     Comments: pt assessed, chart reviewed, no distress noted    Plan of Care: duoneb Q4prn, spiriva QDay    Electronically signed by Alejandro Reno RCP on 3/23/2021 at 5:33 PM    Respiratory Protocol Guidelines     1. Assessment and treatment by Respiratory Therapy will be initiated for medication and therapeutic interventions upon initiation of aerosolized medication. 2. Physician will be contacted for respiratory rate (RR) greater than 35 breaths per minute. Therapy will be held for heart rate (HR) greater than 140 beats per minute, pending direction from physician. 3. Bronchodilators will be administered via Metered Dose Inhaler (MDI) with spacer when the following criteria are met:  a. Alert and cooperative     b. HR < 140 bpm  c. RR < 30 bpm                d. Can demonstrate a 2-3 second inspiratory hold  4.  Bronchodilators will be administered via Hand Held Nebulizer [facility]) to patients when ANY of the following criteria are

## 2021-03-23 NOTE — PLAN OF CARE
Problem: Pain:  Goal: Control of acute pain  Description: Control of acute pain  Outcome: Met This Shift   Patient stated her pain was a 9/10 but was given IV morphine before sheath was pulled in cath lab, so came to us and said it decreased to 7/10. Patient satisfied with pain level at 7/10. Problem: Falls - Risk of:  Goal: Will remain free from falls  Description: Will remain free from falls  Outcome: Met This Shift   Patient uses call light appropriately, bed in lowest position with wheels locked. Non slip socks on.

## 2021-03-24 VITALS
DIASTOLIC BLOOD PRESSURE: 76 MMHG | WEIGHT: 291.01 LBS | BODY MASS INDEX: 58.67 KG/M2 | SYSTOLIC BLOOD PRESSURE: 119 MMHG | TEMPERATURE: 98.8 F | RESPIRATION RATE: 20 BRPM | OXYGEN SATURATION: 92 % | HEART RATE: 88 BPM | HEIGHT: 59 IN

## 2021-03-24 LAB
ANION GAP SERPL CALCULATED.3IONS-SCNC: 10 MMOL/L (ref 3–16)
BUN BLDV-MCNC: 10 MG/DL (ref 7–20)
CALCIUM SERPL-MCNC: 8.9 MG/DL (ref 8.3–10.6)
CHLORIDE BLD-SCNC: 101 MMOL/L (ref 99–110)
CHOLESTEROL, TOTAL: 144 MG/DL (ref 0–199)
CO2: 26 MMOL/L (ref 21–32)
CREAT SERPL-MCNC: 0.7 MG/DL (ref 0.6–1.1)
EKG ATRIAL RATE: 86 BPM
EKG DIAGNOSIS: NORMAL
EKG P AXIS: 44 DEGREES
EKG P-R INTERVAL: 150 MS
EKG Q-T INTERVAL: 374 MS
EKG QRS DURATION: 74 MS
EKG QTC CALCULATION (BAZETT): 447 MS
EKG R AXIS: 46 DEGREES
EKG T AXIS: 65 DEGREES
EKG VENTRICULAR RATE: 86 BPM
GFR AFRICAN AMERICAN: >60
GFR NON-AFRICAN AMERICAN: >60
GLUCOSE BLD-MCNC: 126 MG/DL (ref 70–99)
HCT VFR BLD CALC: 42.2 % (ref 36–48)
HDLC SERPL-MCNC: 37 MG/DL (ref 40–60)
HEMOGLOBIN: 14 G/DL (ref 12–16)
LDL CHOLESTEROL CALCULATED: 78 MG/DL
MCH RBC QN AUTO: 32.4 PG (ref 26–34)
MCHC RBC AUTO-ENTMCNC: 33.2 G/DL (ref 31–36)
MCV RBC AUTO: 97.8 FL (ref 80–100)
PDW BLD-RTO: 16.5 % (ref 12.4–15.4)
PLATELET # BLD: 209 K/UL (ref 135–450)
PMV BLD AUTO: 9.8 FL (ref 5–10.5)
POTASSIUM REFLEX MAGNESIUM: 4.2 MMOL/L (ref 3.5–5.1)
RBC # BLD: 4.32 M/UL (ref 4–5.2)
SODIUM BLD-SCNC: 137 MMOL/L (ref 136–145)
TRIGL SERPL-MCNC: 144 MG/DL (ref 0–150)
VLDLC SERPL CALC-MCNC: 29 MG/DL
WBC # BLD: 8.4 K/UL (ref 4–11)

## 2021-03-24 PROCEDURE — 6370000000 HC RX 637 (ALT 250 FOR IP): Performed by: STUDENT IN AN ORGANIZED HEALTH CARE EDUCATION/TRAINING PROGRAM

## 2021-03-24 PROCEDURE — 36415 COLL VENOUS BLD VENIPUNCTURE: CPT

## 2021-03-24 PROCEDURE — 6370000000 HC RX 637 (ALT 250 FOR IP): Performed by: INTERNAL MEDICINE

## 2021-03-24 PROCEDURE — 2580000003 HC RX 258: Performed by: INTERNAL MEDICINE

## 2021-03-24 PROCEDURE — 93005 ELECTROCARDIOGRAM TRACING: CPT | Performed by: INTERNAL MEDICINE

## 2021-03-24 PROCEDURE — 94640 AIRWAY INHALATION TREATMENT: CPT

## 2021-03-24 PROCEDURE — 85027 COMPLETE CBC AUTOMATED: CPT

## 2021-03-24 PROCEDURE — 99238 HOSP IP/OBS DSCHRG MGMT 30/<: CPT | Performed by: INTERNAL MEDICINE

## 2021-03-24 PROCEDURE — 80048 BASIC METABOLIC PNL TOTAL CA: CPT

## 2021-03-24 PROCEDURE — 94664 DEMO&/EVAL PT USE INHALER: CPT

## 2021-03-24 PROCEDURE — 93010 ELECTROCARDIOGRAM REPORT: CPT | Performed by: INTERNAL MEDICINE

## 2021-03-24 RX ORDER — FAMOTIDINE 20 MG/1
20 TABLET, FILM COATED ORAL 2 TIMES DAILY
Qty: 60 TABLET | Refills: 3 | Status: SHIPPED | OUTPATIENT
Start: 2021-03-24 | End: 2021-09-23 | Stop reason: SDUPTHER

## 2021-03-24 RX ORDER — METOPROLOL SUCCINATE 25 MG/1
25 TABLET, EXTENDED RELEASE ORAL DAILY
Qty: 30 TABLET | Refills: 5 | Status: SHIPPED | OUTPATIENT
Start: 2021-03-25 | End: 2021-10-01 | Stop reason: SDUPTHER

## 2021-03-24 RX ORDER — ASPIRIN 81 MG/1
81 TABLET, CHEWABLE ORAL DAILY
Qty: 30 TABLET | Refills: 3 | Status: SHIPPED | OUTPATIENT
Start: 2021-04-25 | End: 2021-09-23 | Stop reason: SDUPTHER

## 2021-03-24 RX ORDER — ATORVASTATIN CALCIUM 40 MG/1
40 TABLET, FILM COATED ORAL NIGHTLY
Qty: 30 TABLET | Refills: 3 | Status: SHIPPED
Start: 2021-03-24 | End: 2021-05-05 | Stop reason: SINTOL

## 2021-03-24 RX ORDER — CLOPIDOGREL BISULFATE 75 MG/1
75 TABLET ORAL DAILY
Qty: 30 TABLET | Refills: 5 | Status: SHIPPED | OUTPATIENT
Start: 2021-03-25 | End: 2021-10-01 | Stop reason: SDUPTHER

## 2021-03-24 RX ORDER — ASPIRIN 325 MG
325 TABLET ORAL DAILY
Qty: 30 TABLET | Refills: 0 | Status: SHIPPED | OUTPATIENT
Start: 2021-03-24 | End: 2021-10-01 | Stop reason: DRUGHIGH

## 2021-03-24 RX ADMIN — Medication 10 ML: at 08:40

## 2021-03-24 RX ADMIN — TIOTROPIUM BROMIDE INHALATION SPRAY 2 PUFF: 3.12 SPRAY, METERED RESPIRATORY (INHALATION) at 08:24

## 2021-03-24 RX ADMIN — METOPROLOL SUCCINATE 25 MG: 25 TABLET, EXTENDED RELEASE ORAL at 08:39

## 2021-03-24 RX ADMIN — ESCITALOPRAM OXALATE 20 MG: 20 TABLET ORAL at 08:39

## 2021-03-24 RX ADMIN — QUETIAPINE FUMARATE 150 MG: 200 TABLET ORAL at 00:02

## 2021-03-24 RX ADMIN — ACETAMINOPHEN 650 MG: 325 TABLET ORAL at 00:02

## 2021-03-24 RX ADMIN — LISINOPRIL 5 MG: 5 TABLET ORAL at 08:39

## 2021-03-24 RX ADMIN — CLOPIDOGREL BISULFATE 75 MG: 75 TABLET ORAL at 08:39

## 2021-03-24 RX ADMIN — ASPIRIN 325 MG: 325 TABLET ORAL at 08:39

## 2021-03-24 ASSESSMENT — PAIN DESCRIPTION - PAIN TYPE: TYPE: CHRONIC PAIN

## 2021-03-24 ASSESSMENT — PAIN SCALES - GENERAL
PAINLEVEL_OUTOF10: 0
PAINLEVEL_OUTOF10: 9

## 2021-03-24 ASSESSMENT — PAIN DESCRIPTION - ONSET: ONSET: ON-GOING

## 2021-03-24 ASSESSMENT — PAIN DESCRIPTION - LOCATION: LOCATION: SHOULDER

## 2021-03-24 NOTE — CARE COORDINATION
Disposition: Home- No Services Needed    LOC at discharge: Not Applicable  ZEENAT Completed: Not Indicated    Notification completed in HENS/PAS?:  Not Applicable    IMM Completed:   Not Indicated    Transportation:  Transportation PLAN for discharge: family   Mode of Transport: Private Car  Reason for medical transport: Not Applicable  Name of Transport Company: Not Applicable  Time of Transport: TBD    Transport form completed: Not Indicated    Home Care:  [address] ordered at discharge: Not [address]: Not Applicable  Orders faxed: No    Durable Medical Equipment:  DME Provider: n/a   Equipment obtained during hospitalization: n/a    Home Oxygen and Respiratory Equipment:  Oxygen needed at discharge?: Not [address]: Not Applicable  Portable tank available for discharge?: No    Dialysis:  Dialysis patient: No    Dialysis Center:  Not Applicable    Hospice Services:  Location: Not Applicable  Agency: Not Applicable    Consents signed: Not Indicated    Referrals made at [facility] for outpatient continued care:  Not Applicable    Additional CM Notes:   Patient is from home with spouse, independent pta. No CM needs at this time. Spouse to transport home at discharge. The Plan for Transition of Care is related to the following treatment goals of H/O percutaneous left heart catheterization [Z98.890]  CAD S/P percutaneous coronary angioplasty [I25.10, Z98.61]    The Patient and/or patient representative Issac Casey and her family were provided with a choice of provider and agrees with the discharge plan Yes    Freedom of choice list was provided with basic dialogue that supports the patient's individualized plan of care/goals and shares the quality data associated with the providers.  Yes    Care Transitions patient: No    Liv Jimenez RN  [facility] [employer]  Case Management Department  Ph: [phone]  Fax: [phone]

## 2021-03-24 NOTE — DISCHARGE SUMMARY
INTERNAL MEDICINE DEPARTMENT AT [address] [place] [address]  DISCHARGE SUMMARY    Patient ID: D[facility] Lab                                             Discharge Date: 3/24/2021   Patient's PCP: Delgado Carrington                                          Discharge Physician: Shelia Sun MD  Admit Date: 3/23/2021   Admitting Physician: Ermelinda Vergara MD    PROBLEMS DURING HOSPITALIZATION:  Patient Active Problem List   Diagnosis    [facility]non-ST elevated [facility] infarction) [facility]    Dyspnea on exertion    Morbid (severe) obesity due to excess calories ([facility]    Family history of heart failure, positiive TITIN GENE in the family    CAD S/P percutaneous coronary angioplasty       DISCHARGE DIAGNOSES:  1- CAD  2- NSTEMI  3- HTN  4- HLD    Hospital Course: [place] a 52 y. o. female Nanette Guerrero who is morbidly obese, history of hypertension on propanolol at home, COPD, initially presented to ED w/ c/o left shoulder pain, which started 4 weeks ago after a fall. In the ED she was noted to be short of breath on ambulation and tachycardia w/ associated chest tightness intermittently for the last few weeks, occ at rest along with worsening BLE swelling worse at the end of the day. EKG at that time showed septal Q waves w/ mild troponinemia, NSTEMI after which she was started on hep gtt and nitroglycerin and was eventually discharged.     Unfortunately she had ongoing intermittent chest pressure associated w/ anxiety and diaphoresis and continuing shortness of breath with elevated BP. EKG showed diffuse ST elevation and poor R wave progression in R precordial leads prompting angiogram. She underwent successful stent in the proximal right coronary artery without complication. On day of discharge she denies SOB, CP, abdominal pain, nausea/vomiting. She was ambulating well without difficulty.  She will be discharged with ASA, plavix, BB, ACEi and statin therapy.        Physical Exam:  /76   Pulse 88   Temp 98.8 °F (37.1 °C) (Oral)   Resp 20   Ht 4' 11\" (1.499 m)   Wt 291 lb 0.1 oz (132 kg)   LMP 02/28/2021   SpO2 92%   BMI 58.78 kg/m²   General appearance: alert, appears stated age and cooperative  Head: Normocephalic, without obvious abnormality, atraumatic  Eyes: conjunctivae/corneas clear. PERRL, EOM's intact. Fundi benign. Ears: normal TM's and external ear canals both ears  Nose: Nares normal. Septum midline. Mucosa normal. No drainage or sinus tenderness. Throat: lips, mucosa, and tongue normal; teeth and gums normal  Neck: no adenopathy, no carotid bruit, no JVD, supple, symmetrical, trachea midline and thyroid not enlarged, symmetric, no tenderness/mass/nodules  Lungs: clear to auscultation bilaterally  Heart: regular rate and rhythm, S1, S2 normal, no murmur, click, rub or gallop  Abdomen: soft, non-tender; bowel sounds normal; no masses,  no organomegaly  Extremities: R radial site c/d/i  Neurologic: Grossly normal    Consults: cardiology  Significant Diagnostic Studies:  Angiogram  Treatments: stent placement   Disposition: home  Discharged Condition: Stable  Follow Up: Primary Care Physician in two weeks    DISCHARGE MEDICATION:     Medication List      START taking these medications    * aspirin 325 MG tablet  Take 1 tablet by mouth daily  Replaces: aspirin EC 81 MG EC tablet     * aspirin 81 MG chewable tablet  Commonly known as: Aspirin Childrens  Take 1 tablet by mouth daily  Start taking on: April 25, 2021     clopidogrel 75 MG tablet  Commonly known as: PLAVIX  Take 1 tablet by mouth daily  Start taking on: March 25, 2021     famotidine 20 MG tablet  Commonly known as: PEPCID  Take 1 tablet by mouth 2 times daily     metoprolol succinate 25 MG extended release tablet  Commonly known as: TOPROL XL  Take 1 tablet by mouth daily  Start taking on: March 25, 2021         * This list has 2 medication(s) that are the same as other medications prescribed for you.  Read the directions carefully, and ask your doctor or other care provider to review them with you.             CHANGE how you take these medications    atorvastatin 40 MG tablet  Commonly known as: LIPITOR  Take 1 tablet by mouth nightly  What changed: when to take this        CONTINUE taking these medications    diclofenac sodium 1 % Gel  Commonly known as: VOLTAREN  Apply 4 g topically 4 times daily as needed for Pain     furosemide 20 MG tablet  Commonly known as: Lasix  Take 1 tablet by mouth daily     ipratropium-albuterol 0.5-2.5 (3) MG/3ML Soln nebulizer solution  Commonly known as: DUONEB     Lexapro 20 MG tablet  Generic drug: escitalopram     lisinopril 5 MG tablet  Commonly known as: PRINIVIL;ZESTRIL  Take 1 tablet by mouth daily     nicotine 21 MG/24HR  Commonly known as: NICODERM CQ  Place 1 patch onto the skin daily     SEROquel 50 MG tablet  Generic drug: QUEtiapine     tiotropium 18 MCG inhalation capsule  Commonly known as: SPIRIVA        STOP taking these medications    aspirin EC 81 MG EC tablet  Replaced by: aspirin 325 MG tablet     ibuprofen 800 MG tablet  Commonly known as: ADVIL;MOTRIN     propranolol 10 MG tablet  Commonly known as: INDERAL           Where to Get Your Medications      These medications were sent to [address], [place] [address] [facility] [phone]  [address] [place] [address],[address] [place] [address] [place]    Phone: [phone]   · aspirin 325 MG tablet  · aspirin 81 MG chewable tablet  · atorvastatin 40 MG tablet  · clopidogrel 75 MG tablet  · famotidine 20 MG tablet  · metoprolol succinate 25 MG extended release tablet       Activity: activity as tolerated  Diet: cardiac diet  Wound Care: keep wound clean and dry    Time Spent on discharge is more than 45 minutes    Signed:  Ismael Horne MD   3/24/2021

## 2021-03-24 NOTE — PLAN OF CARE
Pt c/o pain, Tylenol administered x 2 thus far this shift. Pt reported minimal pain relief. Repositioning encouraged  Problem: Pain:  Goal: Control of acute pain  Description: Control of acute pain  3/24/2021 0248 by Denzel Hester RN  Outcome: Ongoing     No falls noted thus far this shift, bed in lowest position, alarm on, non-skid socks on, call light within reach, hourly checks, safety maintained, will continue to monitor. Call light within reach. Problem: Falls - Risk of:  Goal: Will remain free from falls  Description: Will remain free from falls  3/24/2021 0248 by Denzel Hester RN  Outcome: Ongoing     No further oozing noted to R wrist puncture site, current/old bleeding is marked on dressing to check for further spread.     Problem: Bleeding:  Goal: Will show no signs and symptoms of excessive bleeding  Description: Will show no signs and symptoms of excessive bleeding  Outcome: Ongoing

## 2021-03-25 ENCOUNTER — CARE COORDINATION (OUTPATIENT)
Dept: CASE MANAGEMENT | Age: 51
End: 2021-03-25

## 2021-03-25 NOTE — CARE COORDINATION
Patient contacted regarding Matias Wagner. Care Transition Nurse contacted the patient by telephone to perform post discharge assessment. Call within 2 business days of discharge: Yes. Verified name and  with patient as identifiers. Provided introduction to self, and explanation of the CTN/ACM role, and reason for call due to risk factors for infection and/or exposure to COVID-19. Symptoms reviewed with patient who verbalized the following symptoms: no new symptoms and no worsening symptoms. Due to no new or worsening symptoms encounter was not routed to provider for escalation. Discussed follow-up appointments. If no appointment was previously scheduled, appointment scheduling offered: Yes  [facility] follow up appointment(s): No future appointments. Non-face-to-face services provided:  Obtained and reviewed discharge summary and/or continuity of care documents  Education of patient/family/caregiver/guardian to support self-management-. Assessment and support for treatment adherence and medication management-. Advance Care Planning:   Does patient have an Advance Directive:  reviewed and current. Patient has following risk factors of: COPD. CTN reviewed discharge instructions, medical action plan and red flags such as increased shortness of breath, increasing fever and signs of decompensation with patient who verbalized understanding. Discussed exposure protocols and quarantine with CDC Guidelines What to do if you are sick with coronavirus disease .  Patient was given an opportunity for questions and concerns. The patient agrees to contact the [facility] exposure line [phone], [facility] department [place] [facility]: ([phone]) and PCP office for questions related to their healthcare. CTN provided contact information for future needs.     Reviewed and educated patient on any new and changed medications related to discharge diagnosis     Was patient discharged with a pulse oximeter? No Discussed and confirmed pulse oximeter discharge instructions and when to notify provider or seek emergency care. Summary  CTN spoke with patient this am for initial COVID call. Patient states she is doing well, wrist is slightly sore, but no drainage present. Patient with no reports of any fever, chills, nausea, vomiting, chest pain, SOB or cough. No other issues or concerns at this time. Plan for follow up call in 5-7 days based on severity of symptoms and risk factors.     Thank Keke Linder RN  Care Transition Coordinator  Contact [facility]ZBEO:[phone]

## 2021-03-31 ENCOUNTER — CARE COORDINATION (OUTPATIENT)
Dept: CASE MANAGEMENT | Age: 51
End: 2021-03-31

## 2021-03-31 NOTE — CARE COORDINATION
[facility] Transitions Initial Follow Up Call    3/31/21    Patient:  Francesca Burnham Patient :  1970  MRN:  [id number]   Reason for Admission:  covid 19  Discharge Date:  3/24/21  RARS:         CTC attempt to reach Pt regarding recent hospital discharge. CTC left voice recording with call back number requesting a call back. Follow up appointments:    No future appointments. CLEVE Knight, RN  Care Transition Coordinator  Contact Number:  [phone]

## 2021-04-07 ENCOUNTER — CARE COORDINATION (OUTPATIENT)
Dept: CASE MANAGEMENT | Age: 51
End: 2021-04-07

## 2021-04-07 NOTE — CARE COORDINATION
[facility] Transitions Follow Up Call    2021    Patient: Reilly Almanzar  Patient : 1970   MRN: [id number]   Reason for Admission:  covid 19   Discharge Date: 3/24/21 RARS: Readmission Risk Score: 15         Spoke with: Sindhu Armida Transitions Subsequent and Final Call    Subsequent and Final Calls  Do you have any ongoing symptoms?: Yes  Patient-reported symptoms: Other  Interventions for patient-reported symptoms: Other  Have your medications changed?: No  Do you have any questions related to your medications?: No  Do you have any needs or concerns that I can assist you with?: No  Identified Barriers: None  Care Transitions Interventions  Other Interventions:         Summary  CTN spoke to the Pt who states she is doing ok and denies s/s r/t Covid 19 but does report sinus drainage with clear mucous. Pt states she has seasonal allergies and plans to try an allergy pill. Pt denies cough, congestion, nausea, vomiting, fever, and chills. Pt will continue to monitor for s/s or infection d/t c/o sinus drainage and contact PCP if issue persist or worsens. From CDC: Are you at higher risk for severe illness?  Wash your hands often.  Avoid close contact (6 feet, which is about two arm lengths) with people who are sick.  Put distance between yourself and other people if COVID-19 is spreading in your community.  Clean and disinfect frequently touched surfaces.  Avoid all cruise travel and non-essential air travel.  Call your healthcare professional if you have concerns about COVID-19 and your underlying condition or if you are sick. Pt will take all meds as prescribed and schedule / keep doctors appt. [facility] provided education on s/s that require medical attention and when to seek medical attention. [facility] advised Pt of use urgent care or physicians 24 hr access line if assistance is needed after hours or on the weekend.   Pt denies any needs or concerns and CT calls have concluded. Follow Up  No future appointments.     Carly Felipe RN

## 2021-04-23 ENCOUNTER — OFFICE VISIT (OUTPATIENT)
Dept: CARDIOLOGY CLINIC | Age: 51
End: 2021-04-23
Payer: COMMERCIAL

## 2021-04-23 VITALS
WEIGHT: 293 LBS | BODY MASS INDEX: 59.78 KG/M2 | DIASTOLIC BLOOD PRESSURE: 90 MMHG | HEART RATE: 96 BPM | SYSTOLIC BLOOD PRESSURE: 138 MMHG

## 2021-04-23 DIAGNOSIS — I25.10 CORONARY ARTERY DISEASE INVOLVING NATIVE CORONARY ARTERY OF NATIVE HEART WITHOUT ANGINA PECTORIS: Primary | ICD-10-CM

## 2021-04-23 DIAGNOSIS — E78.5 HYPERLIPIDEMIA, UNSPECIFIED HYPERLIPIDEMIA TYPE: ICD-10-CM

## 2021-04-23 DIAGNOSIS — I10 HTN (HYPERTENSION), BENIGN: ICD-10-CM

## 2021-04-23 PROCEDURE — 4004F PT TOBACCO SCREEN RCVD TLK: CPT | Performed by: INTERNAL MEDICINE

## 2021-04-23 PROCEDURE — G8427 DOCREV CUR MEDS BY ELIG CLIN: HCPCS | Performed by: INTERNAL MEDICINE

## 2021-04-23 PROCEDURE — G8417 CALC BMI ABV UP PARAM F/U: HCPCS | Performed by: INTERNAL MEDICINE

## 2021-04-23 PROCEDURE — 1111F DSCHRG MED/CURRENT MED MERGE: CPT | Performed by: INTERNAL MEDICINE

## 2021-04-23 PROCEDURE — 99214 OFFICE O/P EST MOD 30 MIN: CPT | Performed by: INTERNAL MEDICINE

## 2021-04-23 NOTE — PROGRESS NOTES
CC:  CAD HTN    HPI:       Rhona Gregg is a 52 y. o. female Nanette Guerrero who is morbidly obese, history of hypertension on propanolol at home, COPD. CP now gone with stenting. .had stent to RCA and no longer has chest apin     Histories      Past Medical History:   has a past medical history of Anxiety, COPD (chronic obstructive pulmonary disease) (HCC), Degenerative disk disease, Depression, High blood pressure, and MRSA (methicillin resistant staph aureus) culture positive.     Surgical History:   has a past surgical history that includes Tubal ligation;  section; and fracture surgery.      Social History:   reports that she has been smoking cigarettes. She has been smoking about 1.50 packs per day. She has never used smokeless tobacco. She reports that she does not drink alcohol or use drugs.      Family History:  No evidence for sudden cardiac death or premature CAD        Medications:         Home Medications  Were reviewed and are listed in nursing record. and/or listed below  Home Medications           Prior to Admission medications    Medication Sig Start Date End Date Taking?  Authorizing Provider   tiotropium (SPIRIVA) 18 MCG inhalation capsule Inhale into the lungs daily Inhale 2 puffs by inhalation daily     Yes Historical Provider, MD   ipratropium-albuterol (DUONEB) 0.5-2.5 (3) MG/3ML SOLN nebulizer solution Inhale 1 vial into the lungs every 4 hours     Yes Historical Provider, MD   ibuprofen (ADVIL;MOTRIN) 800 MG tablet Take 800 mg by mouth as needed for Pain     Yes Historical Provider, MD   escitalopram (LEXAPRO) 20 MG tablet Take 1 tablet by mouth daily      Yes Historical Provider, MD   propranolol (INDERAL) 10 MG tablet Take 1 tablet by mouth daily      Yes Historical Provider, MD   QUEtiapine (SEROQUEL) 50 MG tablet Take 150 mg by mouth daily      Yes Historical Provider, MD               Inpatient Medications:  Scheduled Medications    sodium chloride flush  10 mL Intravenous 2 times per day    aspirin  81 mg Oral Daily    atorvastatin  80 mg Oral Nightly    nitroglycerin  0.5 inch Topical 4 times per day    QUEtiapine  150 mg Oral Daily    tiotropium  2 puff Inhalation Daily    escitalopram  20 mg Oral Daily    albuterol  2.5 mg Nebulization Q4H WA    nicotine  1 patch Transdermal Daily            IV drips:  Infusions Meds    heparin (PORCINE) Infusion 11 Units/kg/hr (03/03/21 6013)            PRN:  PRN Medications   nicotine polacrilex, sodium chloride flush, promethazine **OR** ondansetron, acetaminophen **OR** acetaminophen, polyethylene glycol, perflutren lipid microspheres, heparin (porcine), heparin (porcine), diclofenac sodium, traMADol, ipratropium-albuterol, albuterol        Allergy:      Cephalexin, Codeine, and Vancomycin         Review of Systems:      All 12 point review of symptoms completed. Pertinent positives identified in the HPI, all other review of symptoms negative as below.     CONSTITUTIONAL: Nounanticipated weight loss. No change in energy level, sleep pattern, or activity level. SKIN: No rash or pruritis. EYES: No visual changes or diplopia. No scleral icterus. ENT: No Headaches, hearing loss or vertigo. No mouth sores or sore throat. CARDIOVASCULAR: No chest pain/chest pressure/chest discomfort. No palpitations. RESPIRATORY: No cough or wheezing, no sputum production. No hematemesis. GASTROINTESTINAL: No N/V/D. No abdominal pain, appetite loss, blood in stools. GENITOURINARY: No dysuria, trouble voiding, or hematuria. MUSCULOSKELETAL:  No gait disturbance, weakness or joint complaints. NEUROLOGICAL: No headache, diplopia, change in muscle strength, numbness or tingling. No change in gait, balance, coordination, mood, affect, memory, mentation, behavior. PSHYCH: No anxiety, loss of interest, change in sexual behavior, feelings of self-harm, or confusion. ENDOCRINE: No malaise, fatigue or temperature intolerance.  No excessive thirst,

## 2021-05-05 ENCOUNTER — TELEPHONE (OUTPATIENT)
Dept: CARDIOLOGY CLINIC | Age: 51
End: 2021-05-05

## 2021-05-05 NOTE — TELEPHONE ENCOUNTER
Spoke with pt and instructed her to stop taking the Lipitor for a week and see if that helps.   If she is still feeling bad after a week she will call the office back

## 2021-07-12 ENCOUNTER — TELEPHONE (OUTPATIENT)
Dept: CARDIOLOGY CLINIC | Age: 51
End: 2021-07-12

## 2021-07-12 RX ORDER — LISINOPRIL 5 MG/1
5 TABLET ORAL DAILY
Qty: 90 TABLET | Refills: 1 | Status: SHIPPED | OUTPATIENT
Start: 2021-07-12 | End: 2022-01-25

## 2021-07-12 RX ORDER — FUROSEMIDE 20 MG/1
20 TABLET ORAL DAILY
Qty: 90 TABLET | Refills: 1 | Status: SHIPPED | OUTPATIENT
Start: 2021-07-12 | End: 2022-01-17

## 2021-07-12 NOTE — TELEPHONE ENCOUNTER
Patient called for refills.  She would like 90-day supply:     furosemide (LASIX) 20 MG tablet  Take 1 tablet by mouth daily, Disp-30 tablet, R-3  Print Last Dose: Not Recorded  Refills:3 ordered     [facility] [address] [place] [address], [place] [facility] [address] [facility] [phone] -  [phone]     lisinopril (PRINIVIL;ZESTRIL) 5 MG tablet  Take 1 tablet by mouth daily, Disp-30 tablet, R-3  Print Last Dose: Not Recorded  Refills:3 ordered     Pharmacy:[facility] [address] [facility], [place] [address] [facility] [phone]    Last visit: 4/23/21  Next visit: 10/29/21  Last labs: 3/24/21  Last filled: 3/5/21

## 2021-08-26 ENCOUNTER — TELEPHONE (OUTPATIENT)
Dept: CARDIOLOGY CLINIC | Age: 51
End: 2021-08-26

## 2021-08-26 NOTE — TELEPHONE ENCOUNTER
Patient asking that  to please sign and fax back no later that 3:30pm today a form that will be coming to our office from [facility] so they do not turn off her electric.   Please advise pt at [phone]

## 2021-09-23 ENCOUNTER — TELEPHONE (OUTPATIENT)
Dept: CARDIOLOGY CLINIC | Age: 51
End: 2021-09-23

## 2021-09-23 RX ORDER — ASPIRIN 81 MG/1
81 TABLET, CHEWABLE ORAL DAILY
Qty: 90 TABLET | Refills: 1 | Status: SHIPPED | OUTPATIENT
Start: 2021-09-23 | End: 2022-04-11

## 2021-09-23 RX ORDER — FAMOTIDINE 20 MG/1
20 TABLET, FILM COATED ORAL 2 TIMES DAILY
Qty: 180 TABLET | Refills: 1 | Status: SHIPPED | OUTPATIENT
Start: 2021-09-23 | End: 2022-04-26

## 2021-09-23 NOTE — TELEPHONE ENCOUNTER
[facility] Medication Refills:    aspirin 325 MG tablet  Take 1 tablet by mouth daily, Disp-30 tablet, R-0         famotidine (PEPCID) 20 MG tablet  Take 1 tablet by mouth 2 times daily, Disp-60 tablet, R-3    Pharmacy:[facility] [address] [place] [address] Rd, [place] [address] [facility] [phone]      Last Office Visit: 04/23/21    Next Office Visit: 10/29/21    Last Refill: 03/24/21    Last Labs: 03/24/21

## 2021-10-01 RX ORDER — METOPROLOL SUCCINATE 25 MG/1
25 TABLET, EXTENDED RELEASE ORAL DAILY
Qty: 30 TABLET | Refills: 5 | Status: SHIPPED | OUTPATIENT
Start: 2021-10-01 | End: 2022-04-25

## 2021-10-01 RX ORDER — CLOPIDOGREL BISULFATE 75 MG/1
75 TABLET ORAL DAILY
Qty: 90 TABLET | Refills: 3 | Status: SHIPPED | OUTPATIENT
Start: 2021-10-01

## 2021-10-01 RX ORDER — CLOPIDOGREL BISULFATE 75 MG/1
75 TABLET ORAL DAILY
Qty: 30 TABLET | Refills: 5 | Status: SHIPPED | OUTPATIENT
Start: 2021-10-01 | End: 2021-10-01 | Stop reason: SDUPTHER

## 2021-10-01 NOTE — TELEPHONE ENCOUNTER
Requested Prescriptions     Pending Prescriptions Disp Refills    clopidogrel (PLAVIX) 75 MG tablet 90 tablet 3     Sig: Take 1 tablet by mouth daily                  Last Office Visit: 4/23/2021     Next Office Visit: 10/29/2021

## 2022-01-10 ENCOUNTER — OFFICE VISIT (OUTPATIENT)
Dept: CARDIOLOGY CLINIC | Age: 52
End: 2022-01-10
Payer: COMMERCIAL

## 2022-01-10 VITALS
WEIGHT: 293 LBS | HEART RATE: 89 BPM | BODY MASS INDEX: 61.24 KG/M2 | SYSTOLIC BLOOD PRESSURE: 138 MMHG | DIASTOLIC BLOOD PRESSURE: 84 MMHG

## 2022-01-10 DIAGNOSIS — Z98.61 CAD S/P PERCUTANEOUS CORONARY ANGIOPLASTY: Primary | ICD-10-CM

## 2022-01-10 DIAGNOSIS — G47.30 SLEEP APNEA, UNSPECIFIED TYPE: Primary | ICD-10-CM

## 2022-01-10 DIAGNOSIS — I25.10 CAD S/P PERCUTANEOUS CORONARY ANGIOPLASTY: Primary | ICD-10-CM

## 2022-01-10 PROCEDURE — G8417 CALC BMI ABV UP PARAM F/U: HCPCS | Performed by: INTERNAL MEDICINE

## 2022-01-10 PROCEDURE — 4004F PT TOBACCO SCREEN RCVD TLK: CPT | Performed by: INTERNAL MEDICINE

## 2022-01-10 PROCEDURE — 99214 OFFICE O/P EST MOD 30 MIN: CPT | Performed by: INTERNAL MEDICINE

## 2022-01-10 PROCEDURE — G8484 FLU IMMUNIZE NO ADMIN: HCPCS | Performed by: INTERNAL MEDICINE

## 2022-01-10 PROCEDURE — G8427 DOCREV CUR MEDS BY ELIG CLIN: HCPCS | Performed by: INTERNAL MEDICINE

## 2022-01-10 PROCEDURE — 93000 ELECTROCARDIOGRAM COMPLETE: CPT | Performed by: INTERNAL MEDICINE

## 2022-01-10 RX ORDER — NITROGLYCERIN 40 MG/1
1 PATCH TRANSDERMAL DAILY
Qty: 30 PATCH | Refills: 3 | Status: SHIPPED | OUTPATIENT
Start: 2022-01-10 | End: 2022-06-02

## 2022-01-10 RX ORDER — ROSUVASTATIN CALCIUM 10 MG/1
10 TABLET, COATED ORAL DAILY
Qty: 90 TABLET | Refills: 3 | Status: SHIPPED | OUTPATIENT
Start: 2022-01-10

## 2022-01-10 RX ORDER — NITROGLYCERIN 0.4 MG/1
0.4 TABLET SUBLINGUAL EVERY 5 MIN PRN
Qty: 25 TABLET | Refills: 3 | Status: SHIPPED | OUTPATIENT
Start: 2022-01-10

## 2022-01-10 NOTE — PROGRESS NOTES
CC:  CAD HTN    HPI:       Sinan Weiss is a 52 y. o. female Nanette Guerrero who is morbidly obese, history of hypertension on propanolol at home, COPD. CP now gone with stenting. .had stent to RCA and no longer has chest pain     Histories      Past Medical History:   has a past medical history of Anxiety, COPD (chronic obstructive pulmonary disease) ([address] Degenerative disk disease, Depression, High blood pressure, and MRSA (methicillin resistant staph aureus) culture positive.     Surgical History:   has a past surgical history that includes Tubal ligation;  section; and fracture surgery.      Social History:   reports that she has been smoking cigarettes. She has been smoking about 1.50 packs per day. She has never used smokeless tobacco. She reports that she does not drink alcohol or use drugs.      Family History:  No evidence for sudden cardiac death or premature CAD        Medications:         Home Medications  Were reviewed and are listed in nursing record. and/or listed below  Home Medications           Prior to Admission medications    Medication Sig Start Date End Date Taking?  Authorizing Provider   tiotropium (SPIRIVA) 18 MCG inhalation capsule Inhale into the lungs daily Inhale 2 puffs by inhalation daily     Yes Historical Provider, MD   ipratropium-albuterol (DUONEB) 0.5-2.5 (3) MG/3ML SOLN nebulizer solution Inhale 1 vial into the lungs every 4 hours     Yes Historical Provider, MD   ibuprofen (ADVIL;MOTRIN) 800 MG tablet Take 800 mg by mouth as needed for Pain     Yes Historical Provider, MD   escitalopram (LEXAPRO) 20 MG tablet Take 1 tablet by mouth daily      Yes Historical Provider, MD   propranolol (INDERAL) 10 MG tablet Take 1 tablet by mouth daily      Yes Historical Provider, MD   QUEtiapine (SEROQUEL) 50 MG tablet Take 150 mg by mouth daily      Yes Historical Provider, MD               Inpatient Medications:  Scheduled Medications    sodium chloride flush  10 mL Intravenous 2 times per day    aspirin  81 mg Oral Daily    atorvastatin  80 mg Oral Nightly    nitroglycerin  0.5 inch Topical 4 times per day    QUEtiapine  150 mg Oral Daily    tiotropium  2 puff Inhalation Daily    escitalopram  20 mg Oral Daily    albuterol  2.5 mg Nebulization Q4H WA    nicotine  1 patch Transdermal Daily            IV drips:  Infusions Meds    heparin (PORCINE) Infusion 11 Units/kg/hr (03/03/21 2529)            PRN:  PRN Medications   nicotine polacrilex, sodium chloride flush, promethazine **OR** ondansetron, acetaminophen **OR** acetaminophen, polyethylene glycol, perflutren lipid microspheres, heparin (porcine), heparin (porcine), diclofenac sodium, traMADol, ipratropium-albuterol, albuterol        Allergy:      Cephalexin, Codeine, and Vancomycin         Review of Systems:      All 12 point review of symptoms completed. Pertinent positives identified in the HPI, all other review of symptoms negative as below.     CONSTITUTIONAL: Nounanticipated weight loss. No change in energy level, sleep pattern, or activity level. SKIN: No rash or pruritis. EYES: No visual changes or diplopia. No scleral icterus. ENT: No Headaches, hearing loss or vertigo. No mouth sores or sore throat. CARDIOVASCULAR: No chest pain/chest pressure/chest discomfort. No palpitations. RESPIRATORY: No cough or wheezing, no sputum production. No hematemesis. GASTROINTESTINAL: No N/V/D. No abdominal pain, appetite loss, blood in stools. GENITOURINARY: No dysuria, trouble voiding, or hematuria. MUSCULOSKELETAL:  No gait disturbance, weakness or joint complaints. NEUROLOGICAL: No headache, diplopia, change in muscle strength, numbness or tingling. No change in gait, balance, coordination, mood, affect, memory, mentation, behavior. PSHYCH: No anxiety, loss of interest, change in sexual behavior, feelings of self-harm, or confusion. ENDOCRINE: No malaise, fatigue or temperature intolerance.  No excessive thirst, fluid intake, or urination. No tremor. HEMATOLOGIC: No abnormal bruising or bleeding. ALLERGY: No nasal congestion or hives.        Physical Examination:      Vitals          Vitals:     03/04/21 0424 03/04/21 0429 03/04/21 0551 03/04/21 0750   BP: 107/65   122/78 133/77   Pulse: 93     95   Resp: 21     20   Temp: 97.6 °F (36.4 °C)     97.8 °F (36.6 °C)   TempSrc: Oral     Oral   SpO2: 93%     93%   Weight:   (!) 302 lb 4.8 oz (137.1 kg)                    Wt Readings from Last 3 Encounters:   03/04/21 (!) 302 lb 4.8 oz (137.1 kg)   09/21/20 245 lb (111.1 kg)   06/29/18 239 lb 3.2 oz (108.5 kg)         Objective:  General Appearance: In no acute distress and not in pain. Vital signs: (most recent): Blood pressure 130/70, pulse 99, temperature 97.8 °F (36.6 °C), temperature source Oral, resp. rate 20, weight (!) 302 lb 4.8 oz (137.1 kg),   Lungs:  Increased effort. There are decreased breath sounds and wheezes. Heart: Tachycardia. rrr no S3 normal S1 and S2. Chest: No chest wall tenderness. Abdomen: Abdomen is soft. Bowel sounds are normal.     Extremities: trace edema and much improved. Skin:  Warm and dry.             Labs:           Recent Labs     03/03/21  1408 03/04/21  0620    136   K 4.1 4.9   BUN 7 9   CREATININE 0.7 0.8   CL 97* 99   CO2 31 30   GLUCOSE 153* 148*   CALCIUM 9.1 8.8            Recent Labs     03/03/21  1408 03/03/21 2009 03/04/21  0620   WBC 9.0 10.0 7.9   HGB 14.0 14.1 13.6   HCT 42.1 42.8 40.5    216 187   MCV 96.3 97.5 96.2      No results for input(s): CHOLTOT, TRIG, HDL in the last 72 hours.     Invalid input(s): LIPIDCOMM, CHOLHDL, VLDCHOL, LDL      Recent Labs     03/03/21 2009   INR 1.03             Recent Labs     03/03/21  1408 03/03/21  1800 03/03/21  2009 03/03/21  2222   TROPONINI 0.04* <0.01 <0.01 <0.01      No results for input(s): BNP in the last 72 hours. No results for input(s): TSH in the last 72 hours.   No results for input(s): CHOL, HDL, LDLCALC, TRIG in the last 72 hours. ]           Lab Results   Component Value Date     TROPONINI <0.01 03/03/2021            Imaging:      I personally reviewed imaging studies including CXR, Stress test, TTE/LUCA.     Last ECG (if available) - EKG:  I have reviewed EKG with the following interpretation  NSR, septal Q waves     Telemetry:  NSR     Last Stress (if available):     Last TTE/LUCA(if available):  Echo 3/2019: The left ventricular wall motion is normal.    There is trace mitral regurgitation.     Last Cath (if available):     Stent to RCA xience in ostium.        Assessment / Plan:   NSTEMI recently  -  better    Chest Pain:  CP is gone after stenting of the proximal RCA. LVEF was 65%    Her son has titin cardiomyopathy. Add nitrodur patch 0.2 mg /hr.     HTN:  Fair control  HLP:  Continues on Atorva. Obesity:  DM needs to lose weight Dr Clifford Kent is assessing weight loss. Likely has MICHAELLE  Needs evaluation.   LDL: add crestor 10 mg daily.             Vanessa Dodd MD

## 2022-01-17 NOTE — TELEPHONE ENCOUNTER
Requested Prescriptions     Pending Prescriptions Disp Refills    furosemide (LASIX) 20 MG tablet [Pharmacy Med Name: FUROSEMIDE 20 MG TABLET] 90 tablet 1     Sig: TAKE ONE TABLET BY MOUTH DAILY          Number:90    Refills: 3    Last Office Visit: 4/23/2021     Next Office Visit: 7/28/22

## 2022-01-18 RX ORDER — FUROSEMIDE 20 MG/1
TABLET ORAL
Qty: 90 TABLET | Refills: 3 | Status: SHIPPED | OUTPATIENT
Start: 2022-01-18

## 2022-01-25 RX ORDER — LISINOPRIL 5 MG/1
TABLET ORAL
Qty: 90 TABLET | Refills: 3 | Status: SHIPPED | OUTPATIENT
Start: 2022-01-25

## 2022-01-25 NOTE — TELEPHONE ENCOUNTER
Requested Prescriptions     Pending Prescriptions Disp Refills    lisinopril (PRINIVIL;ZESTRIL) 5 MG tablet [Pharmacy Med Name: LISINOPRIL 5 MG TABLET] 90 tablet 3     Sig: TAKE ONE TABLET BY MOUTH DAILY                Last Office Visit: 4/23/2021     Next Office Visit: 7/28/2022

## 2022-04-11 NOTE — TELEPHONE ENCOUNTER
Requested Prescriptions     Pending Prescriptions Disp Refills    ASPIRIN LOW DOSE 81 MG chewable tablet [Pharmacy Med Name: ASPIRIN 81 MG CHEWABLE TABLET] 30 tablet      Sig: CHEW ONE TABLET BY MOUTH DAILY            Last Office Visit: 4/23/2021     Next Office Visit: 7/28/22

## 2022-04-13 RX ORDER — ASPIRIN 81 MG
TABLET,CHEWABLE ORAL
Qty: 90 TABLET | Refills: 3 | Status: SHIPPED | OUTPATIENT
Start: 2022-04-13

## 2022-04-25 NOTE — TELEPHONE ENCOUNTER
Requested Prescriptions     Pending Prescriptions Disp Refills    metoprolol succinate (TOPROL XL) 25 MG extended release tablet [Pharmacy Med Name: METOPROLOL SUCC ER 25 MG TAB] 90 tablet 3     Sig: TAKE ONE TABLET BY MOUTH DAILY                  Last Office Visit: 4/23/2021     Next Office Visit: 7/28/2022      [facility]

## 2022-04-26 RX ORDER — METOPROLOL SUCCINATE 25 MG/1
TABLET, EXTENDED RELEASE ORAL
Qty: 90 TABLET | Refills: 3 | Status: SHIPPED | OUTPATIENT
Start: 2022-04-26

## 2022-04-26 NOTE — TELEPHONE ENCOUNTER
Requested Prescriptions     Pending Prescriptions Disp Refills    famotidine (PEPCID) 20 MG tablet [Pharmacy Med Name: FAMOTIDINE 20 MG TABLET] 180 tablet 3     Sig: TAKE ONE TABLET BY MOUTH TWICE A DAY              Last Office Visit: 4/23/2021     Next Office Visit: 7/28/2022LLast Labs:Mg.  3/5/2021

## 2022-04-27 RX ORDER — FAMOTIDINE 20 MG/1
TABLET, FILM COATED ORAL
Qty: 180 TABLET | Refills: 3 | Status: SHIPPED | OUTPATIENT
Start: 2022-04-27

## 2022-05-04 ENCOUNTER — HOSPITAL ENCOUNTER (EMERGENCY)
Age: 52
Discharge: HOME | End: 2022-05-04
Attending: EMERGENCY MEDICINE
Payer: COMMERCIAL

## 2022-05-04 VITALS
BODY MASS INDEX: 57.52 KG/M2 | RESPIRATION RATE: 24 BRPM | DIASTOLIC BLOOD PRESSURE: 89 MMHG | TEMPERATURE: 98.4 F | SYSTOLIC BLOOD PRESSURE: 150 MMHG | HEART RATE: 101 BPM | HEIGHT: 60 IN | OXYGEN SATURATION: 96 % | WEIGHT: 293 LBS

## 2022-05-04 DIAGNOSIS — N61.0 CELLULITIS OF RIGHT BREAST: ICD-10-CM

## 2022-05-04 DIAGNOSIS — N61.1 ABSCESS OF BREAST: Primary | ICD-10-CM

## 2022-05-04 PROCEDURE — 10060 I&D ABSCESS SIMPLE/SINGLE: CPT

## 2022-05-04 PROCEDURE — 6370000000 HC RX 637 (ALT 250 FOR IP): Performed by: EMERGENCY MEDICINE

## 2022-05-04 PROCEDURE — 99283 EMERGENCY DEPT VISIT LOW MDM: CPT

## 2022-05-04 RX ORDER — DOXYCYCLINE HYCLATE 100 MG
100 TABLET ORAL 2 TIMES DAILY
Qty: 20 TABLET | Refills: 0 | Status: SHIPPED | OUTPATIENT
Start: 2022-05-04 | End: 2022-05-14

## 2022-05-04 RX ORDER — SULFAMETHOXAZOLE AND TRIMETHOPRIM 800; 160 MG/1; MG/1
1 TABLET ORAL ONCE
Status: COMPLETED | OUTPATIENT
Start: 2022-05-04 | End: 2022-05-04

## 2022-05-04 RX ORDER — SULFAMETHOXAZOLE AND TRIMETHOPRIM 800; 160 MG/1; MG/1
1 TABLET ORAL 2 TIMES DAILY
Qty: 20 TABLET | Refills: 0 | Status: SHIPPED | OUTPATIENT
Start: 2022-05-04 | End: 2022-05-14

## 2022-05-04 RX ORDER — DOXYCYCLINE 100 MG/1
100 CAPSULE ORAL ONCE
Status: COMPLETED | OUTPATIENT
Start: 2022-05-04 | End: 2022-05-04

## 2022-05-04 RX ORDER — HYDROCODONE BITARTRATE AND ACETAMINOPHEN 5; 325 MG/1; MG/1
1 TABLET ORAL EVERY 6 HOURS PRN
Qty: 12 TABLET | Refills: 0 | Status: SHIPPED | OUTPATIENT
Start: 2022-05-04 | End: 2022-05-07

## 2022-05-04 RX ORDER — OXYCODONE HYDROCHLORIDE AND ACETAMINOPHEN 5; 325 MG/1; MG/1
1 TABLET ORAL ONCE
Status: COMPLETED | OUTPATIENT
Start: 2022-05-04 | End: 2022-05-04

## 2022-05-04 RX ADMIN — SULFAMETHOXAZOLE AND TRIMETHOPRIM 1 TABLET: 800; 160 TABLET ORAL at 19:03

## 2022-05-04 RX ADMIN — OXYCODONE AND ACETAMINOPHEN 1 TABLET: 5; 325 TABLET ORAL at 19:03

## 2022-05-04 RX ADMIN — DOXYCYCLINE 100 MG: 100 CAPSULE ORAL at 19:03

## 2022-05-04 ASSESSMENT — PAIN SCALES - GENERAL
PAINLEVEL_OUTOF10: 9
PAINLEVEL_OUTOF10: 9

## 2022-05-04 ASSESSMENT — PAIN DESCRIPTION - LOCATION
LOCATION: BREAST
LOCATION: BREAST;ARM

## 2022-05-04 ASSESSMENT — PAIN - FUNCTIONAL ASSESSMENT: PAIN_FUNCTIONAL_ASSESSMENT: 0-10

## 2022-05-04 ASSESSMENT — PAIN DESCRIPTION - ORIENTATION
ORIENTATION: RIGHT
ORIENTATION: RIGHT

## 2022-05-04 ASSESSMENT — PAIN DESCRIPTION - DESCRIPTORS: DESCRIPTORS: ACHING

## 2022-05-04 NOTE — ED NOTES
Patient to ed with complaint of a right breast abscess which started approx 1 week ago, patient denies drainage.      Julissa Zapata RN  05/04/22 2269

## 2022-05-04 NOTE — ED PROVIDER NOTES
[facility] EMERGENCY DEPT VISIT      Patient Identification  Makayla Aguilar is a 54 y.o. female. Chief Complaint   Abscess (right breast)      History of Present Illness: This is a  54 y.o. female who presents ambulatory  to the ED with complaints of 4 day h/o abscess to right lateral breast/axillary region. No drainage. No fever. No vomiting. Not diabetic. Has h/o MRSA. Has been using warm compresses wtihout relief. Past Medical History:   Diagnosis Date    Anxiety     [facility]ch[facility] obstructive [facility]) ([facility])     Degenerative disk disease     Depression     High blood pressure     MRSA (methicillin resistant staph aureus) culture positive        Past Surgical History:   Procedure Laterality Date     SECTION      FRACTURE SURGERY      rods and plate in left foot    TUBAL LIGATION         No current facility-administered medications for this encounter. Current Outpatient Medications:     HYDROcodone-acetaminophen (NORCO) 5-325 MG per tablet, Take 1 tablet by mouth every 6 hours as needed for Pain for up to 3 days. Intended supply: 3 days.  Take lowest dose possible to manage pain, Disp: 12 tablet, Rfl: 0    doxycycline hyclate (VIBRA-TABS) 100 MG tablet, Take 1 tablet by mouth 2 times daily for 10 days, Disp: 20 tablet, Rfl: 0    sulfamethoxazole-trimethoprim (BACTRIM DS) 800-160 MG per tablet, Take 1 tablet by mouth 2 times daily for 10 days, Disp: 20 tablet, Rfl: 0    famotidine (PEPCID) 20 MG tablet, TAKE ONE TABLET BY MOUTH TWICE A DAY, Disp: 180 tablet, Rfl: 3    metoprolol succinate (TOPROL XL) 25 MG extended release tablet, TAKE ONE TABLET BY MOUTH DAILY, Disp: 90 tablet, Rfl: 3    ASPIRIN LOW DOSE 81 MG chewable tablet, CHEW ONE TABLET BY MOUTH DAILY, Disp: 90 tablet, Rfl: 3    lisinopril (PRINIVIL;ZESTRIL) 5 MG tablet, TAKE ONE TABLET BY MOUTH DAILY, Disp: 90 tablet, Rfl: 3    furosemide (LASIX) 20 MG tablet, TAKE ONE TABLET BY MOUTH DAILY, Disp: 90 tablet, Rfl: 3   nitroGLYCERIN (NITRO-DUR) 0.2 MG/HR, Place 1 patch onto the skin daily (Patient not taking: Reported on 5/4/2022), Disp: 30 patch, Rfl: 3    nitroGLYCERIN (NITROSTAT) 0.4 MG SL tablet, Place 1 tablet under the tongue every 5 minutes as needed for Chest pain (Patient not taking: Reported on 5/4/2022), Disp: 25 tablet, Rfl: 3    rosuvastatin (CRESTOR) 10 MG tablet, Take 1 tablet by mouth daily, Disp: 90 tablet, Rfl: 3    clopidogrel (PLAVIX) 75 MG tablet, Take 1 tablet by mouth daily, Disp: 90 tablet, Rfl: 3    diclofenac sodium (VOLTAREN) 1 % GEL, Apply 4 g topically 4 times daily as needed for Pain (Patient not taking: Reported on 5/4/2022), Disp: 100 g, Rfl: 1    nicotine (NICODERM CQ) 21 MG/24HR, Place 1 patch onto the skin daily, Disp: 30 patch, Rfl: 3    tiotropium (SPIRIVA) 18 MCG inhalation capsule, Inhale into the lungs daily Inhale 2 puffs by inhalation daily, Disp: , Rfl:     ipratropium-albuterol (DUONEB) 0.5-2.5 (3) MG/3ML SOLN nebulizer solution, Inhale 1 vial into the lungs every 4 hours, Disp: , Rfl:     escitalopram (LEXAPRO) 20 MG tablet, Take 1 tablet by mouth daily , Disp: , Rfl:     QUEtiapine (SEROQUEL) 50 MG tablet, Take 150 mg by mouth daily , Disp: , Rfl:     Allergies   Allergen Reactions    Cephalexin Anaphylaxis    Codeine      Tylenol #3      Vancomycin      Elevates creatnine. Social History     Socioeconomic History    Marital status:       Spouse name: Not on file    Number of children: Not on file    Years of education: Not on file    Highest education level: Not on file   Occupational History    Not on file   Tobacco Use    Smoking status: Current Every Day Smoker     Packs/day: 1.50     Types: Cigarettes    Smokeless tobacco: Never Used   Substance and Sexual Activity    Alcohol use: No    Drug use: No    Sexual activity: Yes     Partners: Male   Other Topics Concern    Not on file   Social History Narrative    Not on file     Social Determinants of Health     Financial Resource Strain:     Difficulty of Paying Living Expenses: Not on file   Food Insecurity:     Worried About Running Out of Food in the Last Year: Not on file    [place] of Food in the Last Year: Not on file   Transportation Needs:     Lack of Transportation (Medical): Not on file    Lack of Transportation (Non-Medical): Not on file   Physical Activity:     Days of Exercise per Week: Not on file    Minutes of Exercise per Session: Not on file   Stress:     Feeling of Stress : Not on file   Social Connections:     Frequency of Communication with Friends and Family: Not on file    Frequency of Social Gatherings with Friends and Family: Not on file    Attends [employer] Services: Not on file    Active Member of [address] [place] [address] [email or web address] or Organizations: Not on file    Attends Club or Organization Meetings: Not on file    Marital Status: Not on file   Intimate Partner Violence:     Fear of Current or Ex-Partner: Not on file    Emotionally Abused: Not on file    Physically Abused: Not on file    Sexually Abused: Not on file   Housing Stability:     Unable to Pay for Housing in the Last Year: Not on file    Number of [place] in the Last Year: Not on file    Unstable Housing in the Last Year: Not on file       Nursing Notes Reviewed      ROS:  General: no fever  ENT: no sinus congestion, no sore throat  RESP: no cough, no shortness of breath  CARDIAC: no chest pain  GI: no abdominal pain, no vomiting, no diarrhea  Musculoskeletal: no arthralgia, no myalgia, no back pain,  no joint swelling  NEURO: no headache, no numbness, no weakness, no dizziness  DERM: no rash, + erythema, no ecchymosis, no wounds      PHYSICAL EXAM:  GENERAL APPEARANCE: Jamey Mcintosh is in no acute respiratory distress. Awake and alert.   VITAL SIGNS:   ED Triage Vitals [05/04/22 1715]   Enc Vitals Group      BP (!) 150/89      Pulse 101      Resp 24      Temp 98.4 °F (36.9 °C)      Temp Source Oral      SpO2 96 % Weight 295 lb 3.2 oz (133.9 kg)      Height 4' 11.5\" (1.511 m)      Head Circumference       Peak Flow       Pain Score       Pain Loc       Pain Edu? Excl. in [address]? HEAD: Normocephalic, atraumatic. EYES:  Extraocular muscles are intact. Conjunctivas are pink. Negative scleral icterus. ENT:  Mucous membranes are moist.   NECK: Nontender and supple. CHEST: Clear to auscultation bilaterally. No rales, rhonchi, or wheezing. HEART:  Regular rate and rhythm. No murmurs. Strong and equal pulses in the upper and lower extremities. ABDOMEN: Soft,  nondistended, positive bowel sounds. abdomen is nontender. MUSCULOSKELETAL:  Active range of motion of the upper and lower extremities. No edema. NEUROLOGICAL: Awake, alert and oriented x 3. Power intact in the upper and lower extremities. DERMATOLOGIC: No petechiae, rashes, or ecchymoses. 3cm fluctuant abscess right lateral breast  With surrounding cellulitsi measuring about 10x5cm      ED COURSE AND MEDICAL DECISION MAKING:      Radiology:  All plain films have been evaluated by myself. They may have been overread by radiologist as noted in chart. Other radiologic studies (i.e. CT, MRI, ultrasounds, etc ) have been interpreted by radiologist.     No orders to display       Labs:  No results found for this visit on 05/04/22. Treatment in the department:  Patient received   Medications   doxycycline monohydrate (MONODOX) capsule 100 mg (100 mg Oral Given 5/4/22 1903)   sulfamethoxazole-trimethoprim (BACTRIM DS;SEPTRA DS) 800-160 MG per tablet 1 tablet (1 tablet Oral Given 5/4/22 1903)   oxyCODONE-acetaminophen (PERCOCET) 5-325 MG per tablet 1 tablet (1 tablet Oral Given 5/4/22 1903)     Incision and Drainage: The Incision and Drainage procedure was explained to the patient and I receive verbal consent from them. This breast/chest wall abscess was prepped with full strength Betadine, anesthetized with lidocaine with epinephrine. The area was prepped again and draped. The area of most fluctuance was identified, and a number 11 blade was used to make a 1 cm linear incision. A blunt probe was used to deloculated the wound and a moderate amount of purulent material was expressed. .  The patient experienced some pain but generally tolerated the procedure quite well. 4\" x 4\" gauzes were taped in place and the patient was given instructions to start warm compresses for the next 2-3 days at home. Medical decision making:  Patient with abscess and cellulitis to lateral breast near axilla. No fever. No vomiting. No toxicity. Not diabetic. Small area of surrounding cellulitis. Incised and drained and placed on antibiotics. I estimate there is LOW risk for COMPARTMENT SYNDROME, NECROTIZING FASCIITIS,  DEEP SPACE INFECTION, , ANAPHYLAXIS, SEPSIS, thus I consider the discharge disposition reasonable. Mer Espinoza and I have discussed the diagnosis and risks, and we agree with discharging home to follow-up with their primary doctor. Clinical Impression:  1. Abscess of breast    2. Cellulitis of right breast        Dispo:  Patient will be discharged  at this time. Patient was informed of this decision and agrees with plan. I have discussed lab and xray findings with patient and they understand. Questions were answered to the best of my ability. Discharge vitals:  Blood pressure (!) 150/89, pulse 101, temperature 98.4 °F (36.9 °C), temperature source Oral, resp. rate 24, height 4' 11.5\" (1.511 m), weight 295 lb 3.2 oz (133.9 kg), SpO2 96 %. Prescriptions given:   Discharge Medication List as of 5/4/2022  7:28 PM      START taking these medications    Details   HYDROcodone-acetaminophen (NORCO) 5-325 MG per tablet Take 1 tablet by mouth every 6 hours as needed for Pain for up to 3 days. Intended supply: 3 days.  Take lowest dose possible to manage pain, Disp-12 tablet, R-0Print      doxycycline hyclate (VIBRA-TABS) 100 MG tablet Take 1 tablet by mouth 2 times daily for 10 days, Disp-20 tablet, R-0Print      sulfamethoxazole-trimethoprim (BACTRIM DS) 800-160 MG per tablet Take 1 tablet by mouth 2 times daily for 10 days, Disp-20 tablet, R-0Print               This chart was created using dragon voice recognition software.         Lori Hernandez MD  05/05/22 0764

## 2022-06-02 ENCOUNTER — TELEMEDICINE (OUTPATIENT)
Dept: PULMONOLOGY | Age: 52
End: 2022-06-02
Payer: COMMERCIAL

## 2022-06-02 DIAGNOSIS — F41.8 DEPRESSION WITH ANXIETY: Chronic | ICD-10-CM

## 2022-06-02 DIAGNOSIS — J44.9 COPD, SEVERITY TO BE DETERMINED (HCC): Chronic | ICD-10-CM

## 2022-06-02 DIAGNOSIS — G47.10 HYPERSOMNIA: Primary | ICD-10-CM

## 2022-06-02 DIAGNOSIS — R06.83 SNORING: ICD-10-CM

## 2022-06-02 DIAGNOSIS — Z98.61 CAD S/P PERCUTANEOUS CORONARY ANGIOPLASTY: Chronic | ICD-10-CM

## 2022-06-02 DIAGNOSIS — E66.01 CLASS 3 SEVERE OBESITY DUE TO EXCESS CALORIES WITH SERIOUS COMORBIDITY AND BODY MASS INDEX (BMI) OF 50.0 TO 59.9 IN ADULT (HCC): Chronic | ICD-10-CM

## 2022-06-02 DIAGNOSIS — I25.10 CAD S/P PERCUTANEOUS CORONARY ANGIOPLASTY: Chronic | ICD-10-CM

## 2022-06-02 DIAGNOSIS — I10 ESSENTIAL HYPERTENSION: Chronic | ICD-10-CM

## 2022-06-02 PROBLEM — E78.2 MIXED HYPERLIPIDEMIA: Status: ACTIVE | Noted: 2022-03-08

## 2022-06-02 PROBLEM — E66.813 CLASS 3 SEVERE OBESITY DUE TO EXCESS CALORIES WITH SERIOUS COMORBIDITY AND BODY MASS INDEX (BMI) OF 50.0 TO 59.9 IN ADULT: Chronic | Status: ACTIVE | Noted: 2021-03-03

## 2022-06-02 PROCEDURE — G8427 DOCREV CUR MEDS BY ELIG CLIN: HCPCS | Performed by: INTERNAL MEDICINE

## 2022-06-02 PROCEDURE — 99204 OFFICE O/P NEW MOD 45 MIN: CPT | Performed by: INTERNAL MEDICINE

## 2022-06-02 ASSESSMENT — ENCOUNTER SYMPTOMS
VOMITING: 0
NAUSEA: 0
APNEA: 1
ABDOMINAL DISTENTION: 0
CHOKING: 0
ABDOMINAL PAIN: 0
EYE PAIN: 0
SHORTNESS OF BREATH: 1
PHOTOPHOBIA: 0
CHEST TIGHTNESS: 0
ALLERGIC/IMMUNOLOGIC NEGATIVE: 1
RHINORRHEA: 0

## 2022-06-02 ASSESSMENT — SLEEP AND FATIGUE QUESTIONNAIRES
HOW LIKELY ARE YOU TO NOD OFF OR FALL ASLEEP WHILE SITTING AND READING: 3
HOW LIKELY ARE YOU TO NOD OFF OR FALL ASLEEP WHILE SITTING AND TALKING TO SOMEONE: 1
HOW LIKELY ARE YOU TO NOD OFF OR FALL ASLEEP IN A CAR, WHILE STOPPED FOR A FEW MINUTES IN TRAFFIC: 0
HOW LIKELY ARE YOU TO NOD OFF OR FALL ASLEEP WHILE LYING DOWN TO REST IN THE AFTERNOON WHEN CIRCUMSTANCES PERMIT: 2
HOW LIKELY ARE YOU TO NOD OFF OR FALL ASLEEP WHILE WATCHING TV: 3
HOW LIKELY ARE YOU TO NOD OFF OR FALL ASLEEP WHILE SITTING INACTIVE IN A PUBLIC PLACE: 0
ESS TOTAL SCORE: 15
HOW LIKELY ARE YOU TO NOD OFF OR FALL ASLEEP WHEN YOU ARE A PASSENGER IN A CAR FOR AN HOUR WITHOUT A BREAK: 3
HOW LIKELY ARE YOU TO NOD OFF OR FALL ASLEEP WHILE SITTING QUIETLY AFTER LUNCH WITHOUT ALCOHOL: 3

## 2022-06-02 NOTE — PROGRESS NOTES
Linda Lamas [facility]  [id number] [place] [address], [address] [facility]- [phone]   [facility] [address] Leonia Osgood. [address] [place] [address]. Yaniv Norris [address] [phone] [address] [facility], was evaluated through a synchronous (real-time) audio-video  encounter. The patient (or guardian if applicable) is aware that this is a billable  service, which includes applicable co-pays. This Virtual Visit was conducted with  patient's (and/or legal guardian's) consent. The visit was conducted pursuant to  the emergency declaration under the [facility], [id number] waiver authority and the [employer] General Act. Patient identification was verified,  and a caregiver was present when appropriate. The patient was located in a  state where the provider was licensed to provide care. Assessment:      Visit Diagnoses and Associated Orders     Hypersomnia   (New Problem)  -  Primary    needs work-up    POLYSOMNOGRAPHY (PSG) - Diagnostic Testing [12326 Custom]   - Future Order         Snoring   (New Problem)      needs work-up    POLYSOMNOGRAPHY (PSG) - Diagnostic Testing [97033 Custom]   - Future Order         CAD S/P percutaneous coronary angioplasty   (Stable)           Essential hypertension   (Stable)           COPD, severity to be determined (HCC)   (Stable)           Depression with anxiety   (Stable)           Class 3 severe obesity due to excess calories with serious comorbidity and body mass index (BMI) of 50.0 to 59.9 in [facility])   (Not Stable)                  Plan:      One or more undiagnosed new problem with uncertain prognosis till final diagnosis is made. Differential diagnosis includes but not limited to: MICHAELLE, PLMD's, narcolepsy, parasomnias. Reviewed MICHAELLE (which is the highest likelihood diagnosis): pathophysiology, diagnosis, complications and treatment. Instructed her not to drive if drowsy. Continue medications per her PCP and other physicians. Limit caffeine use after 3pm. Will do PSG to rule-out MICHAELLE and other sleep disorders. 1 wk follow up after study to review her results. The chronic medical conditions listed are directly related to the primary diagnosis listed above. The management of the primary diagnosis affects the secondary diagnosis and vice versa. This information was analyzed to assess complexity and medical decision making in regards to further testing and management. Continue meds for: CAD, COPD, HTN, and depression/JANIE. Pt would medically benefit from wt loss for MICHAELLE (diet, exercise, surgical). Encouraged to quit tobacco in all forms, discussed different techniques to quit. Orders Placed This Encounter   Procedures    POLYSOMNOGRAPHY (PSG) - Diagnostic Testing          Subjective:     Patient ID: Connor Ariza is a 50 y.o. female. Chief Complaint   Patient presents with    Daytime Sleepiness    Snoring       HPI:      Connor Ariza is a 50 y.o. female referred by Minerva Dwyer MD for a sleep evaluation. She complains of: snoring, witnessed apneas, excessive daytime sleepiness , non-restorative sleep, nocturia, waking choking/gasping, AM headaches, napping, decreased concentration and tossing and turning at night. She denies: cataplexy and hypnagogic hallucinations. Symptoms began several years ago, gradually worsening since that time    Previous evaluation and treatment has included- None     Chronic stable medical conditions: CAD, COPD, HTN, and depression/JANIE  Chronic unstable medical conditions: obesity    DOT/CDL - No  FAA/'s license -No    Previous Report(s) Reviewed: historical medical records, office notes, and referral letter(s). Pertinent data has been documented. [place] - Total score: 15    Caffeine Intake - Pop/Soda: 6-7 can(s) per day, will drink up to 7 pm    Social History     Socioeconomic History    Marital status:       Spouse name: Not on file    Number of children: Not on file    Years of education: Not on file    Highest education level: Not on file   Occupational History    Not on file   Tobacco Use    Smoking status: Current Every Day Smoker     Packs/day: 1.50     Years: 32.00     Pack years: 48.00     Types: Cigarettes     Start date: 200    Smokeless tobacco: Never Used   Vaping Use    Vaping Use: Never used   Substance and Sexual Activity    Alcohol use: No    Drug use: No    Sexual activity: Yes     Partners: Male   Other Topics Concern    Not on file   Social History Narrative    Not on file     Social Determinants of Health     Financial Resource Strain:     Difficulty of Paying Living Expenses: Not on file   Food Insecurity:     Worried About Running Out of Food in the Last Year: Not on file    [place] of Food in the Last Year: Not on file   Transportation Needs:     Lack of Transportation (Medical): Not on file    Lack of Transportation (Non-Medical):  Not on file   Physical Activity:     Days of Exercise per Week: Not on file    Minutes of Exercise per Session: Not on file   Stress:     Feeling of Stress : Not on file   Social Connections:     Frequency of Communication with Friends and Family: Not on file    Frequency of Social Gatherings with Friends and Family: Not on file    Attends Jain Services: Not on file    Active Member of [address] [place] [address] [employer] or Organizations: Not on file    Attends Club or Organization Meetings: Not on file    Marital Status: Not on file   Intimate Partner Violence:     Fear of Current or Ex-Partner: Not on file    Emotionally Abused: Not on file    Physically Abused: Not on file    Sexually Abused: Not on file   Housing Stability:     Unable to Pay for Housing in the Last Year: Not on file    Number of [place] in the Last Year: Not on file    Unstable Housing in the Last Year: Not on file        Current Outpatient Medications   Medication Instructions    ASPIRIN LOW DOSE 81 MG chewable tablet CHEW ONE TABLET BY MOUTH DAILY    clopidogrel (PLAVIX) 75 mg, Oral, DAILY    escitalopram (LEXAPRO) 20 MG tablet 1 tablet, Oral, DAILY    famotidine (PEPCID) 20 MG tablet TAKE ONE TABLET BY MOUTH TWICE A DAY    furosemide (LASIX) 20 MG tablet TAKE ONE TABLET BY MOUTH DAILY    ipratropium-albuterol (DUONEB) 0.5-2.5 (3) MG/3ML SOLN nebulizer solution 1 vial, Inhalation, EVERY 4 HOURS    lisinopril (PRINIVIL;ZESTRIL) 5 MG tablet TAKE ONE TABLET BY MOUTH DAILY    metoprolol succinate (TOPROL XL) 25 MG extended release tablet TAKE ONE TABLET BY MOUTH DAILY    nicotine (NICODERM CQ) 21 MG/24HR 1 patch, TransDERmal, DAILY    nitroGLYCERIN (NITROSTAT) 0.4 mg, SubLINGual, EVERY 5 MIN PRN    QUEtiapine (SEROQUEL) 150 mg, Oral, DAILY    rosuvastatin (CRESTOR) 10 mg, Oral, DAILY    tiotropium (SPIRIVA) 18 MCG inhalation capsule Inhalation, DAILY, Inhale 2 puffs by inhalation daily       Allergies as of 2022 - Fully Reviewed 2022   Allergen Reaction Noted    Cephalexin Anaphylaxis 08/10/2013    Codeine  2020    Vancomycin  2014       Patient Active Problem List   Diagnosis    NSTEMI (non-ST elevated myocardial infarction) (HCC)    Dyspnea on exertion    Class 3 severe obesity due to excess calories with serious comorbidity and body mass index (BMI) of 50.0 to 59.9 in adult [facility])    Family history of heart failure, positiive TITIN GENE in the family    CAD S/P percutaneous coronary angioplasty    Depression with anxiety    Essential hypertension    Mixed hyperlipidemia    COPD, severity to be determined [facility])       Past Medical History:   Diagnosis Date    Anxiety     COPD (chronic obstructive pulmonary disease) (HCC)     Degenerative disk disease     Depression     High blood pressure     MRSA (methicillin resistant staph aureus) culture positive        Past Surgical History:   Procedure Laterality Date     SECTION      FRACTURE SURGERY rods and plate in left foot    TUBAL LIGATION         Family History   Problem Relation Age of Onset    Cancer Father        Review of Systems   Constitutional: Positive for fatigue and unexpected weight change. Negative for activity change and appetite change. HENT: Positive for congestion. Negative for nosebleeds, postnasal drip, rhinorrhea and sneezing. Eyes: Negative for photophobia, pain and visual disturbance. Respiratory: Positive for apnea and shortness of breath. Negative for choking and chest tightness. Cardiovascular: Negative. Gastrointestinal: Negative for abdominal distention, abdominal pain, nausea and vomiting. Endocrine: Negative for cold intolerance and heat intolerance. Genitourinary: Positive for frequency. Negative for difficulty urinating, dysuria and urgency. Musculoskeletal: Negative. Negative for neck pain and neck stiffness. Skin: Negative. Allergic/Immunologic: Negative. Neurological: Negative for tremors, seizures, syncope and weakness. Hematological: Negative for adenopathy. Does not bruise/bleed easily. Psychiatric/Behavioral: Positive for decreased concentration, dysphoric mood and sleep disturbance. Negative for agitation, behavioral problems and confusion. Objective:     Vitals:  Patient reported Height and Weight Calculated BMI   Patient-Reported Vitals 6/2/2022   Patient-Reported Weight 300 lb   Patient-Reported Height 4.111/2       59.6     Due to COVID-19 this was a virtual visit and physical exam was deferred.     Electronically signed by Monster Treviño MD on6/2/2022 at 11:40 AM

## 2022-06-02 NOTE — LETTER
[facility]  [place] [address] [place] [address]  Phone: [phone]  Fax: [phone]           Deepak Yin MD      June 2, 2022     Patient: Stella Hammer   MR Number: [id number]   YOB: 1970   Date of Visit: 6/2/2022       Dear Dr. Leslie Velasco: Thank you for referring Jn Fenton to me for evaluation/treatment. Below are the relevant portions of my assessment and plan of care. Visit Diagnoses and Associated Orders     Hypersomnia   (New Problem)  -  Primary    needs work-up    POLYSOMNOGRAPHY (PSG) - Diagnostic Testing [14913 Custom]   - Future Order         Snoring   (New Problem)      needs work-up    POLYSOMNOGRAPHY (PSG) - Diagnostic Testing [41933 Custom]   - Future Order         CAD S/P percutaneous coronary angioplasty   (Stable)           Essential hypertension   (Stable)           COPD, severity to be determined (HCC)   (Stable)           Depression with anxiety   (Stable)           Class 3 severe obesity due to excess calories with serious comorbidity and body mass index (BMI) of 50.0 to 59.9 in [facility])   (Not Stable)                 One or more undiagnosed new problem with uncertain prognosis till final diagnosis is made. Differential diagnosis includes but not limited to: MICHAELLE, PLMD's, narcolepsy, parasomnias. Reviewed MICHAELLE (which is the highest likelihood diagnosis): pathophysiology, diagnosis, complications and treatment. Instructed her not to drive if drowsy. Continue medications per her PCP and other physicians. Limit caffeine use after 3pm. Will do PSG to rule-out MICHAELLE and other sleep disorders. 1 wk follow up after study to review her results. The chronic medical conditions listed are directly related to the primary diagnosis listed above. The management of the primary diagnosis affects the secondary diagnosis and vice versa.     This information was analyzed to assess complexity and medical decision making in regards to further testing and management. Continue meds for: CAD, COPD, HTN, and depression/JANIE. Pt would medically benefit from wt loss for MICHAELLE (diet, exercise, surgical). Encouraged to quit tobacco in all forms, discussed different techniques to quit. Orders Placed This Encounter   Procedures    POLYSOMNOGRAPHY (PSG) - Diagnostic Testing       If you have questions, please do not hesitate to call me. I look forward to following Elton Mode along with you. Sincerely,        Dariel Chowdhury MD    CC providers:  Gifty Scott MD  [address]  [address]  [address] Jordan Sweeney MD  Via Fax: [phone]

## 2022-09-29 ENCOUNTER — OFFICE VISIT (OUTPATIENT)
Dept: CARDIOLOGY CLINIC | Age: 52
End: 2022-09-29
Payer: COMMERCIAL

## 2022-09-29 VITALS
BODY MASS INDEX: 59.06 KG/M2 | WEIGHT: 293 LBS | SYSTOLIC BLOOD PRESSURE: 130 MMHG | HEART RATE: 80 BPM | DIASTOLIC BLOOD PRESSURE: 80 MMHG

## 2022-09-29 DIAGNOSIS — I21.4 NSTEMI (NON-ST ELEVATED MYOCARDIAL INFARCTION) (HCC): Primary | ICD-10-CM

## 2022-09-29 PROCEDURE — 99214 OFFICE O/P EST MOD 30 MIN: CPT | Performed by: INTERNAL MEDICINE

## 2022-09-29 PROCEDURE — 3017F COLORECTAL CA SCREEN DOC REV: CPT | Performed by: INTERNAL MEDICINE

## 2022-09-29 PROCEDURE — 93000 ELECTROCARDIOGRAM COMPLETE: CPT | Performed by: INTERNAL MEDICINE

## 2022-09-29 PROCEDURE — 4004F PT TOBACCO SCREEN RCVD TLK: CPT | Performed by: INTERNAL MEDICINE

## 2022-09-29 PROCEDURE — G8427 DOCREV CUR MEDS BY ELIG CLIN: HCPCS | Performed by: INTERNAL MEDICINE

## 2022-09-29 PROCEDURE — G8417 CALC BMI ABV UP PARAM F/U: HCPCS | Performed by: INTERNAL MEDICINE

## 2022-09-29 RX ORDER — ISOSORBIDE MONONITRATE 30 MG/1
15 TABLET, EXTENDED RELEASE ORAL DAILY
Qty: 30 TABLET | Refills: 3 | Status: SHIPPED | OUTPATIENT
Start: 2022-09-29

## 2022-09-29 NOTE — PROGRESS NOTES
CC:  CAD HTN    HPI:       Elvira Angelo is a 52 y.o. female Elvira Angelo who is morbidly obese, history of hypertension on propanolol at home, COPD. CP now gone with stenting. .had stent to RCA and no longer has chest pain. Rare chest pain. ECG with poor r wave progression     Histories      Past Medical History:   has a past medical history of Anxiety, COPD (chronic obstructive pulmonary disease) ([address] Degenerative disk disease, Depression, High blood pressure, and MRSA (methicillin resistant staph aureus) culture positive. Surgical History:   has a past surgical history that includes Tubal ligation;  section; and fracture surgery. Social History:   reports that she has been smoking cigarettes. She has been smoking about 1.50 packs per day. She has never used smokeless tobacco. She reports that she does not drink alcohol or use drugs. Family History:  No evidence for sudden cardiac death or premature CAD        Medications:         Home Medications  Were reviewed and are listed in nursing record. and/or listed below  Home Medications           Prior to Admission medications    Medication Sig Start Date End Date Taking?  Authorizing Provider   tiotropium (SPIRIVA) 18 MCG inhalation capsule Inhale into the lungs daily Inhale 2 puffs by inhalation daily     Yes Historical Provider, MD   ipratropium-albuterol (DUONEB) 0.5-2.5 (3) MG/3ML SOLN nebulizer solution Inhale 1 vial into the lungs every 4 hours     Yes Historical Provider, MD   ibuprofen (ADVIL;MOTRIN) 800 MG tablet Take 800 mg by mouth as needed for Pain     Yes Historical Provider, MD   escitalopram (LEXAPRO) 20 MG tablet Take 1 tablet by mouth daily      Yes Historical Provider, MD   propranolol (INDERAL) 10 MG tablet Take 1 tablet by mouth daily      Yes Historical Provider, MD   QUEtiapine (SEROQUEL) 50 MG tablet Take 150 mg by mouth daily      Yes Historical Provider, MD               Inpatient Medications:  Scheduled Medications    sodium chloride flush  10 mL Intravenous 2 times per day    aspirin  81 mg Oral Daily    atorvastatin  80 mg Oral Nightly    nitroglycerin  0.5 inch Topical 4 times per day    QUEtiapine  150 mg Oral Daily    tiotropium  2 puff Inhalation Daily    escitalopram  20 mg Oral Daily    albuterol  2.5 mg Nebulization Q4H WA    nicotine  1 patch Transdermal Daily            IV drips:  Infusions Meds    heparin (PORCINE) Infusion 11 Units/kg/hr (03/03/21 1288)            PRN:  PRN Medications   nicotine polacrilex, sodium chloride flush, promethazine **OR** ondansetron, acetaminophen **OR** acetaminophen, polyethylene glycol, perflutren lipid microspheres, heparin (porcine), heparin (porcine), diclofenac sodium, traMADol, ipratropium-albuterol, albuterol        Allergy:      Cephalexin, Codeine, and Vancomycin         Review of Systems:      All 12 point review of symptoms completed. Pertinent positives identified in the HPI, all other review of symptoms negative as below. CONSTITUTIONAL: Nounanticipated weight loss. No change in energy level, sleep pattern, or activity level. SKIN: No rash or pruritis. EYES: No visual changes or diplopia. No scleral icterus. ENT: No Headaches, hearing loss or vertigo. No mouth sores or sore throat. CARDIOVASCULAR: No chest pain/chest pressure/chest discomfort. No palpitations. RESPIRATORY: No cough or wheezing, no sputum production. No hematemesis. GASTROINTESTINAL: No N/V/D. No abdominal pain, appetite loss, blood in stools. GENITOURINARY: No dysuria, trouble voiding, or hematuria. MUSCULOSKELETAL:  No gait disturbance, weakness or joint complaints. NEUROLOGICAL: No headache, diplopia, change in muscle strength, numbness or tingling. No change in gait, balance, coordination, mood, affect, memory, mentation, behavior. PSHYCH: No anxiety, loss of interest, change in sexual behavior, feelings of self-harm, or confusion.   ENDOCRINE: No malaise, fatigue or 72 hours. No results for input(s): CHOL, HDL, LDLCALC, TRIG in the last 72 hours.]           Lab Results   Component Value Date     TROPONINI <0.01 03/03/2021            Imaging:      I personally reviewed imaging studies including CXR, Stress test, TTE/LUCA. Last ECG (if available) - EKG:  I have reviewed EKG with the following interpretation  NSR, septal Q waves     Telemetry:  NSR     Last Stress (if available):     Last TTE/LUCA(if available):  Echo 3/2019: The left ventricular wall motion is normal.    There is trace mitral regurgitation. Last Cath (if available):     Stent to RCA xience in ostium. ECG with NSR anf qTc 427 ms at 80 bpm on 9/29/22. Assessment / Plan:   NSTEMI recently  -  better    Chest Pain:  CP is gone after stenting of the proximal RCA. LVEF was 65%    Her son has titin cardiomyopathy. Add nitrodur patch 0.2 mg /hr. HTN:  Fair control  HLP:  Continues on Atorva. Obesity:  DM needs to lose weight Dr Rich Desai is assessing weight loss. Likely has MICHAELLE  Needs evaluation. LDL: check chol. crestor 10 mg daily. Continue current medications. Stable cardiovascular status.              Steven Castle MD

## 2022-10-13 DIAGNOSIS — I21.4 NSTEMI (NON-ST ELEVATED MYOCARDIAL INFARCTION) (HCC): ICD-10-CM

## 2022-10-14 LAB
BASOPHILS ABSOLUTE: 0 K/UL (ref 0–0.2)
BASOPHILS RELATIVE PERCENT: 0.5 %
EOSINOPHILS ABSOLUTE: 0.1 K/UL (ref 0–0.6)
EOSINOPHILS RELATIVE PERCENT: 1.8 %
HCT VFR BLD CALC: 41.3 % (ref 36–48)
HEMOGLOBIN: 13.6 G/DL (ref 12–16)
LYMPHOCYTES ABSOLUTE: 1.6 K/UL (ref 1–5.1)
LYMPHOCYTES RELATIVE PERCENT: 20.1 %
MCH RBC QN AUTO: 33.9 PG (ref 26–34)
MCHC RBC AUTO-ENTMCNC: 32.8 G/DL (ref 31–36)
MCV RBC AUTO: 103.4 FL (ref 80–100)
MONOCYTES ABSOLUTE: 0.4 K/UL (ref 0–1.3)
MONOCYTES RELATIVE PERCENT: 4.9 %
NEUTROPHILS ABSOLUTE: 5.9 K/UL (ref 1.7–7.7)
NEUTROPHILS RELATIVE PERCENT: 72.7 %
PDW BLD-RTO: 17.4 % (ref 12.4–15.4)
PLATELET # BLD: 235 K/UL (ref 135–450)
PMV BLD AUTO: 9.2 FL (ref 5–10.5)
RBC # BLD: 4 M/UL (ref 4–5.2)
WBC # BLD: 8.1 K/UL (ref 4–11)

## 2022-10-21 ENCOUNTER — TELEPHONE (OUTPATIENT)
Dept: CARDIOLOGY CLINIC | Age: 52
End: 2022-10-21

## 2022-11-26 ENCOUNTER — HOSPITAL ENCOUNTER (EMERGENCY)
Age: 52
Discharge: HOME | End: 2022-11-26
Attending: EMERGENCY MEDICINE
Payer: COMMERCIAL

## 2022-11-26 VITALS
SYSTOLIC BLOOD PRESSURE: 126 MMHG | HEIGHT: 60 IN | TEMPERATURE: 98.7 F | HEART RATE: 103 BPM | WEIGHT: 293 LBS | OXYGEN SATURATION: 94 % | RESPIRATION RATE: 22 BRPM | DIASTOLIC BLOOD PRESSURE: 67 MMHG | BODY MASS INDEX: 57.52 KG/M2

## 2022-11-26 DIAGNOSIS — L73.9 FOLLICULITIS: Primary | ICD-10-CM

## 2022-11-26 PROCEDURE — 6370000000 HC RX 637 (ALT 250 FOR IP): Performed by: EMERGENCY MEDICINE

## 2022-11-26 PROCEDURE — 99283 EMERGENCY DEPT VISIT LOW MDM: CPT

## 2022-11-26 RX ORDER — CLINDAMYCIN HYDROCHLORIDE 300 MG/1
300 CAPSULE ORAL 4 TIMES DAILY
Qty: 40 CAPSULE | Refills: 0 | Status: SHIPPED | OUTPATIENT
Start: 2022-11-26 | End: 2022-12-06

## 2022-11-26 RX ORDER — NAPROXEN 500 MG/1
500 TABLET ORAL 2 TIMES DAILY
Qty: 20 TABLET | Refills: 0 | Status: SHIPPED | OUTPATIENT
Start: 2022-11-26 | End: 2022-12-06

## 2022-11-26 RX ORDER — OXYCODONE HYDROCHLORIDE AND ACETAMINOPHEN 5; 325 MG/1; MG/1
1-2 TABLET ORAL EVERY 6 HOURS PRN
Qty: 8 TABLET | Refills: 0 | Status: SHIPPED | OUTPATIENT
Start: 2022-11-26 | End: 2022-12-03

## 2022-11-26 RX ORDER — SULFAMETHOXAZOLE AND TRIMETHOPRIM 800; 160 MG/1; MG/1
1 TABLET ORAL 2 TIMES DAILY
Qty: 20 TABLET | Refills: 0 | Status: SHIPPED | OUTPATIENT
Start: 2022-11-26 | End: 2022-12-06

## 2022-11-26 RX ORDER — NAPROXEN 500 MG/1
500 TABLET ORAL ONCE
Status: DISCONTINUED | OUTPATIENT
Start: 2022-11-26 | End: 2022-11-26 | Stop reason: HOSPADM

## 2022-11-26 RX ORDER — CLINDAMYCIN HYDROCHLORIDE 300 MG/1
300 CAPSULE ORAL ONCE
Status: COMPLETED | OUTPATIENT
Start: 2022-11-26 | End: 2022-11-26

## 2022-11-26 RX ADMIN — CLINDAMYCIN HYDROCHLORIDE 300 MG: 300 CAPSULE ORAL at 19:18

## 2022-11-27 NOTE — DISCHARGE INSTRUCTIONS
Discharge home  Sitz bath's  Bactrim  Keflex  Percocet for pain  Naprosyn for pain  Follow-up with OB/GYN

## 2022-11-27 NOTE — ED PROVIDER NOTES
[facility]   eMERGENCY dEPARTMENT eNCOUnter      Pt Name: Poala Paul  MRN: [id number]  Armstrongfjames 1970  Date of evaluation: 2022  Provider: Talia Barclay MD  PCP: [place]       Chief Complaint   Patient presents with    Abscess       HISTORY OFPRESENT ILLNESS   (Location/Symptom, Timing/Onset, Context/Setting, Quality, Duration, Modifying Factors,Severity)  Note limiting factors. Paola Paul is a 50 y.o. female who thinks that she has an abscess on her labia she says she has had 1 before she says that she thinks that it popped and opened while she was in the waiting room she does complain of 4-5 pain denies any fever or chills she is not a diabetic    Nursing Notes were all reviewed and agreed with or any disagreements were addressed  in the HPI. REVIEW OF SYSTEMS    (2-9 systems for level 4, 10 or more for level 5)     Review of Systems    Positives and Pertinent negatives as per HPI. Except as noted above in the ROS, all other systems were reviewed andnegative.        PASTMEDICAL HISTORY     Past Medical History:   Diagnosis Date    Anxiety     COPD (chronic obstructive pulmonary disease) (Formerly [facility])     Degenerative disk disease     Depression     High blood pressure     MRSA (methicillin resistant staph aureus) culture positive          SURGICAL HISTORY       Past Surgical History:   Procedure Laterality Date     SECTION      FRACTURE SURGERY      rods and plate in left foot    TUBAL LIGATION           CURRENT MEDICATIONS       Previous Medications    ASPIRIN LOW DOSE 81 MG CHEWABLE TABLET    CHEW ONE TABLET BY MOUTH DAILY    CLOPIDOGREL (PLAVIX) 75 MG TABLET    Take 1 tablet by mouth daily    ESCITALOPRAM (LEXAPRO) 20 MG TABLET    Take 1 tablet by mouth daily     FAMOTIDINE (PEPCID) 20 MG TABLET    TAKE ONE TABLET BY MOUTH TWICE A DAY    FUROSEMIDE (LASIX) 20 MG TABLET    TAKE ONE TABLET BY MOUTH DAILY IPRATROPIUM-ALBUTEROL (DUONEB) 0.5-2.5 (3) MG/3ML SOLN NEBULIZER SOLUTION    Inhale 1 vial into the lungs every 4 hours    ISOSORBIDE MONONITRATE (IMDUR) 30 MG EXTENDED RELEASE TABLET    Take 0.5 tablets by mouth daily    LISINOPRIL (PRINIVIL;ZESTRIL) 5 MG TABLET    TAKE ONE TABLET BY MOUTH DAILY    METOPROLOL SUCCINATE (TOPROL XL) 25 MG EXTENDED RELEASE TABLET    TAKE ONE TABLET BY MOUTH DAILY    NICOTINE (NICODERM CQ) 21 MG/24HR    Place 1 patch onto the skin daily    NITROGLYCERIN (NITROSTAT) 0.4 MG SL TABLET    Place 1 tablet under the tongue every 5 minutes as needed for Chest pain    QUETIAPINE (SEROQUEL) 50 MG TABLET    Take 150 mg by mouth daily     ROSUVASTATIN (CRESTOR) 10 MG TABLET    Take 1 tablet by mouth daily    TIOTROPIUM (SPIRIVA) 18 MCG INHALATION CAPSULE    Inhale into the lungs daily Inhale 2 puffs by inhalation daily       ALLERGIES     Cephalexin, Codeine, and Vancomycin    FAMILY HISTORY       Family History   Problem Relation Age of Onset    Cancer Father     Sleep Apnea Sister     Sleep Apnea Brother     Sleep Apnea Brother     Sleep Apnea Daughter           SOCIAL HISTORY       Social History     Socioeconomic History    Marital status:       Spouse name: None    Number of children: None    Years of education: None    Highest education level: None   Tobacco Use    Smoking status: Every Day     Packs/day: 1.50     Years: 32.00     Pack years: 48.00     Types: Cigarettes     Start date: 200    Smokeless tobacco: Never    Tobacco comments:     now down to less than 1/2 ppd   Vaping Use    Vaping Use: Never used   Substance and Sexual Activity    Alcohol use: No    Drug use: No    Sexual activity: Yes     Partners: Male       SCREENINGS      @FLOW([id number])@      PHYSICAL EXAM    (up to 7 for level 4, 8 or more for level 5)     ED Triage Vitals [11/26/22 1900]   BP Temp Temp Source Heart Rate Resp SpO2 Height Weight   126/67 98.7 °F (37.1 °C) Oral (!) 103 22 94 % 4' 11.5\" (1.511 m) 294 lb 6.4 oz (133.5 kg)       Physical Exam      General Appearance:  Alert, cooperative, no distress, appears stated age. Head:  Normocephalic, without obviousabnormality, atraumatic. Eyes:  conjunctiva/corneas clear, EOM's intact. Sclera anicteric. ENT: Mucous membranes moist.   Neck: Supple, symmetrical, trachea midline, no adenopathy. No jugular venous distention. Lungs:   Clear to auscultation bilaterally, respirationsunlabored. No rales, rhonchi or wheezes. Chest Wall:  No tenderness. Heart:  Regular rate and rhythm, S1 and S2 normal, no murmur, rub or gallop. Abdomen:   Soft, non-tender, bowel sounds active,   no masses, no organomegaly. What appears to be a large skin tag just below the left labia majora it is firm it does not necessarily feel fluctuant it measures about 2 cm x 2 cm unclear to me whether it is filled with purulent material or whether it would be blood   Extremities: No edema, cords or calf tenderness. Full range of motion. Pulses: 2+ and symmetric   Skin: Turgor is normal, no rashes or lesions. Neurologic: Alert and oriented X 3. No focal findings. Motor grossly normal.  Speech clear, no drift, CN III-XII grossly intact,        DIAGNOSTIC RESULTS   LABS:    Labs Reviewed - No data to display    All other labs were within normal range or not returned as of this dictation. EKG: All EKG's are interpreted by the Emergency Department Physician who eithersigns or Co-signs this chart in the absence of a cardiologist.        RADIOLOGY:   Non-plain film images such as CT, Ultrasound and MRI are read by the radiologist. Plain radiographic images are visualized by myself.       *    Interpretation per the Radiologist below, if available at the time of this note:    No orders to display         PROCEDURES   Unless otherwise noted below, none     Procedures    *    CRITICAL CARE TIME   N/A      EMERGENCY DEPARTMENT COURSE and DIFFERENTIALDIAGNOSIS/MDM:   Vitals:    Vitals: 11/26/22 1900   BP: 126/67   Pulse: (!) 103   Resp: 22   Temp: 98.7 °F (37.1 °C)   TempSrc: Oral   SpO2: 94%   Weight: 294 lb 6.4 oz (133.5 kg)   Height: 4' 11.5\" (1.511 m)       Patient was given thefollowing medications:  Medications   clindamycin (CLEOCIN) capsule 300 mg (has no administration in time range)   naproxen (NAPROSYN) tablet 500 mg (has no administration in time range)           The patient tolerated their visit well. The patient and / or the familywere informed of the results of any tests, a time was given to answer questions. FINAL IMPRESSION      1. Folliculitis          DISPOSITION/PLAN   DISPOSITION Discharge - Pending Orders Complete 11/26/2022 07:10:55 PM    Will be for discharge sitz bath's and follow-up with OB/GYN as well as  PATIENT REFERRED TO:  *[address] [place] [address] [facility]    In 2 days      DISCHARGE MEDICATIONS:  New Prescriptions    CLINDAMYCIN (CLEOCIN) 300 MG CAPSULE    Take 1 capsule by mouth 4 times daily for 10 days    NAPROXEN (NAPROSYN) 500 MG TABLET    Take 1 tablet by mouth 2 times daily for 20 doses    OXYCODONE-ACETAMINOPHEN (PERCOCET) 5-325 MG PER TABLET    Take 1-2 tablets by mouth every 6 hours as needed for Pain for up to 7 days.     SULFAMETHOXAZOLE-TRIMETHOPRIM (BACTRIM DS) 800-160 MG PER TABLET    Take 1 tablet by mouth 2 times daily for 10 days       DISCONTINUED MEDICATIONS:  Discontinued Medications    No medications on file              (Please note that portions of this note were completed with a voice recognition program.  Efforts were made to edit the dictations but occasionally words are mis-transcribed.)    Clementina Villar MD (electronically signed)       Clementina Villar MD  11/26/22 8461

## 2022-12-08 RX ORDER — CLOPIDOGREL BISULFATE 75 MG/1
TABLET ORAL
Qty: 90 TABLET | Refills: 3 | Status: SHIPPED | OUTPATIENT
Start: 2022-12-08

## 2022-12-08 NOTE — TELEPHONE ENCOUNTER
Requested Prescriptions     Pending Prescriptions Disp Refills    clopidogrel (PLAVIX) 75 MG tablet [Pharmacy Med Name: CLOPIDOGREL 75 MG TABLET] 90 tablet 3     Sig: TAKE ONE TABLET BY MOUTH DAILY              Last Office Visit: 4/23/2021     Next Office Visit:03.31.2023

## 2023-01-04 ENCOUNTER — OFFICE VISIT (OUTPATIENT)
Dept: CARDIOLOGY CLINIC | Age: 53
End: 2023-01-04
Payer: COMMERCIAL

## 2023-01-04 VITALS
WEIGHT: 291 LBS | BODY MASS INDEX: 57.79 KG/M2 | HEART RATE: 89 BPM | DIASTOLIC BLOOD PRESSURE: 88 MMHG | SYSTOLIC BLOOD PRESSURE: 138 MMHG

## 2023-01-04 DIAGNOSIS — I21.4 NSTEMI (NON-ST ELEVATED MYOCARDIAL INFARCTION) (HCC): Primary | ICD-10-CM

## 2023-01-04 DIAGNOSIS — I10 ESSENTIAL HYPERTENSION: Primary | ICD-10-CM

## 2023-01-04 PROCEDURE — G8417 CALC BMI ABV UP PARAM F/U: HCPCS | Performed by: INTERNAL MEDICINE

## 2023-01-04 PROCEDURE — G8427 DOCREV CUR MEDS BY ELIG CLIN: HCPCS | Performed by: INTERNAL MEDICINE

## 2023-01-04 PROCEDURE — 3075F SYST BP GE 130 - 139MM HG: CPT | Performed by: INTERNAL MEDICINE

## 2023-01-04 PROCEDURE — 3079F DIAST BP 80-89 MM HG: CPT | Performed by: INTERNAL MEDICINE

## 2023-01-04 PROCEDURE — 3017F COLORECTAL CA SCREEN DOC REV: CPT | Performed by: INTERNAL MEDICINE

## 2023-01-04 PROCEDURE — 93000 ELECTROCARDIOGRAM COMPLETE: CPT | Performed by: INTERNAL MEDICINE

## 2023-01-04 PROCEDURE — 4004F PT TOBACCO SCREEN RCVD TLK: CPT | Performed by: INTERNAL MEDICINE

## 2023-01-04 PROCEDURE — G8484 FLU IMMUNIZE NO ADMIN: HCPCS | Performed by: INTERNAL MEDICINE

## 2023-01-04 PROCEDURE — 99214 OFFICE O/P EST MOD 30 MIN: CPT | Performed by: INTERNAL MEDICINE

## 2023-01-04 RX ORDER — NITROGLYCERIN 0.4 MG/1
0.4 TABLET SUBLINGUAL EVERY 5 MIN PRN
Qty: 25 TABLET | Refills: 3 | Status: SHIPPED | OUTPATIENT
Start: 2023-01-04

## 2023-01-04 RX ORDER — ISOSORBIDE MONONITRATE 30 MG/1
15 TABLET, EXTENDED RELEASE ORAL DAILY
Qty: 90 TABLET | Refills: 3 | Status: SHIPPED | OUTPATIENT
Start: 2023-01-04

## 2023-01-04 NOTE — PROGRESS NOTES
CC:  CAD HTN  left shoulder changes. HPI:       [place] Hoyos is a 52 y.o. female [place] Hoyos who is morbidly obese, history of hypertension on propanolol at home, COPD. CP now gone with stenting. .had stent to RCA and no longer has chest pain. Rare chest pain. ECG with is normal today. Pt has reproducible left shoulder pain on palpation. Histories      Past Medical History:   has a past medical history of Anxiety, COPD (chronic obstructive pulmonary disease) ([address] Degenerative disk disease, Depression, High blood pressure, and MRSA (methicillin resistant staph aureus) culture positive. Surgical History:   has a past surgical history that includes Tubal ligation;  section; and fracture surgery. Social History:   reports that she has been smoking cigarettes. She has been smoking about 1.50 packs per day. She has never used smokeless tobacco. She reports that she does not drink alcohol or use drugs. Family History:  No evidence for sudden cardiac death or premature CAD        Medications:         Home Medications  Were reviewed and are listed in nursing record. and/or listed below  Home Medications           Prior to Admission medications    Medication Sig Start Date End Date Taking?  Authorizing Provider   tiotropium (SPIRIVA) 18 MCG inhalation capsule Inhale into the lungs daily Inhale 2 puffs by inhalation daily     Yes Historical Provider, MD   ipratropium-albuterol (DUONEB) 0.5-2.5 (3) MG/3ML SOLN nebulizer solution Inhale 1 vial into the lungs every 4 hours     Yes Historical Provider, MD   ibuprofen (ADVIL;MOTRIN) 800 MG tablet Take 800 mg by mouth as needed for Pain     Yes Historical Provider, MD   escitalopram (LEXAPRO) 20 MG tablet Take 1 tablet by mouth daily      Yes Historical Provider, MD   propranolol (INDERAL) 10 MG tablet Take 1 tablet by mouth daily      Yes Historical Provider, MD   QUEtiapine (SEROQUEL) 50 MG tablet Take 150 mg by mouth daily      Yes Historical Provider, MD               Inpatient Medications:  Scheduled Medications    sodium chloride flush  10 mL Intravenous 2 times per day    aspirin  81 mg Oral Daily    atorvastatin  80 mg Oral Nightly    nitroglycerin  0.5 inch Topical 4 times per day    QUEtiapine  150 mg Oral Daily    tiotropium  2 puff Inhalation Daily    escitalopram  20 mg Oral Daily    albuterol  2.5 mg Nebulization Q4H WA    nicotine  1 patch Transdermal Daily            IV drips:  Infusions Meds    heparin (PORCINE) Infusion 11 Units/kg/hr (03/03/21 7196)            PRN:  PRN Medications   nicotine polacrilex, sodium chloride flush, promethazine **OR** ondansetron, acetaminophen **OR** acetaminophen, polyethylene glycol, perflutren lipid microspheres, heparin (porcine), heparin (porcine), diclofenac sodium, traMADol, ipratropium-albuterol, albuterol        Allergy:      Cephalexin, Codeine, and Vancomycin         Review of Systems:      All 12 point review of symptoms completed. Pertinent positives identified in the HPI, all other review of symptoms negative as below.     CONSTITUTIONAL: Nounanticipated weight loss. No change in energy level, sleep pattern, or activity level.     SKIN: No rash or pruritis.  EYES: No visual changes or diplopia. No scleral icterus.  ENT: No Headaches, hearing loss or vertigo. No mouth sores or sore throat.  CARDIOVASCULAR: No chest pain/chest pressure/chest discomfort. No palpitations.   RESPIRATORY: No cough or wheezing, no sputum production. No hematemesis.    GASTROINTESTINAL: No N/V/D. No abdominal pain, appetite loss, blood in stools.  GENITOURINARY: No dysuria, trouble voiding, or hematuria.  MUSCULOSKELETAL:  No gait disturbance, weakness or joint complaints.  NEUROLOGICAL: No headache, diplopia, change in muscle strength, numbness or tingling. No change in gait, balance, coordination, mood, affect, memory, mentation, behavior.  PSHYCH: No anxiety, loss of interest, change in sexual behavior,  feelings of self-harm, or confusion. ENDOCRINE: No malaise, fatigue or temperature intolerance. No excessive thirst, fluid intake, or urination. No tremor. HEMATOLOGIC: No abnormal bruising or bleeding. ALLERGY: No nasal congestion or hives. Physical Examination:      Vitals          Vitals:     03/04/21 0424 03/04/21 0429 03/04/21 0551 03/04/21 0750   BP: 107/65   122/78 133/77   Pulse: 93     95   Resp: 21     20   Temp: 97.6 °F (36.4 °C)     97.8 °F (36.6 °C)   TempSrc: Oral     Oral   SpO2: 93%     93%   Weight:   (!) 302 lb 4.8 oz (137.1 kg)                    Wt Readings from Last 3 Encounters:   03/04/21 (!) 302 lb 4.8 oz (137.1 kg)   09/21/20 245 lb (111.1 kg)   06/29/18 239 lb 3.2 oz (108.5 kg)         Objective:  General Appearance: In no acute distress and not in pain. Vital signs: (most recent): Blood pressure 130/70, pulse 99, temperature 97.8 °F (36.6 °C), temperature source Oral, resp. rate 20, weight (!) 302 lb 4.8 oz (137.1 kg),   Lungs:  Increased effort. There are decreased breath sounds and wheezes. Heart: Tachycardia. rrr no S3 normal S1 and S2. Chest: No chest wall tenderness. Abdomen: Abdomen is soft. Bowel sounds are normal.     Extremities: trace edema and much improved. Skin:  Warm and dry. Labs:           Recent Labs     03/03/21  1408 03/04/21  0620    136   K 4.1 4.9   BUN 7 9   CREATININE 0.7 0.8   CL 97* 99   CO2 31 30   GLUCOSE 153* 148*   CALCIUM 9.1 8.8            Recent Labs     03/03/21  1408 03/03/21 2009 03/04/21  0620   WBC 9.0 10.0 7.9   HGB 14.0 14.1 13.6   HCT 42.1 42.8 40.5    216 187   MCV 96.3 97.5 96.2      No results for input(s): CHOLTOT, TRIG, HDL in the last 72 hours.      Invalid input(s): [address] [facility], VLDCHOL, LDL      Recent Labs     03/03/21  2009   INR 1.03             Recent Labs     03/03/21  1408 03/03/21  1800 03/03/21 2009 03/03/21  2222   TROPONINI 0.04* <0.01 <0.01 <0.01      No results for input(s): BNP in the last 72 hours. No results for input(s): TSH in the last 72 hours. No results for input(s): CHOL, HDL, LDLCALC, TRIG in the last 72 hours.]           Lab Results   Component Value Date     TROPONINI <0.01 03/03/2021            Imaging:      I personally reviewed imaging studies including CXR, Stress test, TTE/LUCA. Last ECG (if available) - EKG:  I have reviewed EKG with the following interpretation  NSR, septal Q waves     Telemetry:  NSR     Last Stress (if available):     Last TTE/LUCA(if available):  Echo 3/2019: The left ventricular wall motion is normal.    There is trace mitral regurgitation. Last Cath (if available):     Stent to RCA xience in ostium. ECG with NSR with no acute changes. Assessment / Plan:   NSTEMI recently  -  better      Chest Pain:  CP is gone after stenting of the proximal RCA. LVEF was 65%    Her son has titin cardiomyopathy. Add nitrodur patch 0.2 mg /hr. HTN:  Fair control  HLP:  Continues on Atorva. Obesity:  losing weight now 291 lbs. Encouraged to continue to lose and stop coca cola use. Obesity:  DM .  Likely has MICHAELLE  Needs evaluation. LDL: check chol. crestor 10 mg daily. Continue current medications. Stable cardiovascular status.              Jeronimo Schwab MD

## 2023-02-06 RX ORDER — ROSUVASTATIN CALCIUM 10 MG/1
TABLET, COATED ORAL
Qty: 90 TABLET | Refills: 3 | Status: SHIPPED | OUTPATIENT
Start: 2023-02-06

## 2023-02-06 NOTE — TELEPHONE ENCOUNTER
Requested Prescriptions     Pending Prescriptions Disp Refills    rosuvastatin (CRESTOR) 10 MG tablet [Pharmacy Med Name: ROSUVASTATIN CALCIUM 10 MG TAB] 90 tablet 3     Sig: TAKE ONE TABLET BY MOUTH DAILY              Last Office Visit: 1/4/2023     Next Office Visit: 3/31/2023       Last Labs: [phone] [facility] results

## 2023-03-13 ENCOUNTER — TELEPHONE (OUTPATIENT)
Dept: CARDIOLOGY CLINIC | Age: 53
End: 2023-03-13

## 2023-03-13 NOTE — TELEPHONE ENCOUNTER
Patient called in for a refill of the following medication, she has been out of it for two weeks:    lisinopril (PRINIVIL;ZESTRIL) 5 MG tablet [[id number]]     Order Details  Dose, Route, Frequency: As Directed   Dispense Quantity: 90 tablet Refills: 3          Sig: TAKE ONE TABLET BY MOUTH DAILY     Alfredo Espinosa [id number] - [address] - F [phone]   [address] [place] [address]   Phone:  [phone]  Fax:  [phone]    Lov 1.4.23  Nov 3.31.23

## 2023-03-14 RX ORDER — LISINOPRIL 5 MG/1
5 TABLET ORAL DAILY
Qty: 90 TABLET | Refills: 1 | Status: SHIPPED | OUTPATIENT
Start: 2023-03-14

## 2023-03-31 ENCOUNTER — OFFICE VISIT (OUTPATIENT)
Dept: CARDIOLOGY CLINIC | Age: 53
End: 2023-03-31
Payer: COMMERCIAL

## 2023-03-31 VITALS
WEIGHT: 284.6 LBS | HEART RATE: 68 BPM | DIASTOLIC BLOOD PRESSURE: 76 MMHG | SYSTOLIC BLOOD PRESSURE: 138 MMHG | BODY MASS INDEX: 56.52 KG/M2

## 2023-03-31 DIAGNOSIS — J44.9 COPD, SEVERITY TO BE DETERMINED (HCC): ICD-10-CM

## 2023-03-31 DIAGNOSIS — I10 HTN (HYPERTENSION), BENIGN: ICD-10-CM

## 2023-03-31 DIAGNOSIS — I25.10 CAD S/P PERCUTANEOUS CORONARY ANGIOPLASTY: ICD-10-CM

## 2023-03-31 DIAGNOSIS — I25.10 CORONARY ARTERY DISEASE INVOLVING NATIVE CORONARY ARTERY OF NATIVE HEART WITHOUT ANGINA PECTORIS: Primary | ICD-10-CM

## 2023-03-31 DIAGNOSIS — E78.5 HYPERLIPIDEMIA, UNSPECIFIED HYPERLIPIDEMIA TYPE: ICD-10-CM

## 2023-03-31 DIAGNOSIS — Z98.61 CAD S/P PERCUTANEOUS CORONARY ANGIOPLASTY: ICD-10-CM

## 2023-03-31 PROCEDURE — 3075F SYST BP GE 130 - 139MM HG: CPT | Performed by: INTERNAL MEDICINE

## 2023-03-31 PROCEDURE — 4004F PT TOBACCO SCREEN RCVD TLK: CPT | Performed by: INTERNAL MEDICINE

## 2023-03-31 PROCEDURE — 3017F COLORECTAL CA SCREEN DOC REV: CPT | Performed by: INTERNAL MEDICINE

## 2023-03-31 PROCEDURE — 99214 OFFICE O/P EST MOD 30 MIN: CPT | Performed by: INTERNAL MEDICINE

## 2023-03-31 PROCEDURE — G8417 CALC BMI ABV UP PARAM F/U: HCPCS | Performed by: INTERNAL MEDICINE

## 2023-03-31 PROCEDURE — 3023F SPIROM DOC REV: CPT | Performed by: INTERNAL MEDICINE

## 2023-03-31 PROCEDURE — 3078F DIAST BP <80 MM HG: CPT | Performed by: INTERNAL MEDICINE

## 2023-03-31 PROCEDURE — G8484 FLU IMMUNIZE NO ADMIN: HCPCS | Performed by: INTERNAL MEDICINE

## 2023-03-31 PROCEDURE — G8428 CUR MEDS NOT DOCUMENT: HCPCS | Performed by: INTERNAL MEDICINE

## 2023-03-31 RX ORDER — GABAPENTIN 100 MG/1
CAPSULE ORAL
COMMUNITY
Start: 2023-03-28

## 2023-03-31 NOTE — PROGRESS NOTES
No results for input(s): BNP in the last 72 hours. No results for input(s): TSH in the last 72 hours. No results for input(s): CHOL, HDL, LDLCALC, TRIG in the last 72 hours.]           Lab Results   Component Value Date     TROPONINI <0.01 03/03/2021            Imaging:      I personally reviewed imaging studies including CXR, Stress test, TTE/LUCA. Last ECG (if available) - EKG:  I have reviewed EKG with the following interpretation  NSR, septal Q waves     Telemetry:  NSR     Last Stress (if available):     Last TTE/LUCA(if available):  Echo 3/2019: The left ventricular wall motion is normal.    There is trace mitral regurgitation. Last Cath (if available):     Stent to RCA xience in ostium. Diagnosis Orders   1. Coronary artery disease involving native coronary artery of native heart without angina pectoris        2. HTN (hypertension), benign        3. Hyperlipidemia, unspecified hyperlipidemia type        4. CAD S/P percutaneous coronary angioplasty        5. COPD, severity to be determined [facility])               Assessment / Plan:   NSTEMI recently  -  better      Chest Pain:  CP is gone after stenting of the proximal RCA. LVEF was 65%    Her son has titin cardiomyopathy. Pts brother is on heart txp list.  Better with 1/2 isosorbide. HTN:  Fair control  HLP:  Continues on Atorva. Obesity:  losing weight now 291 lbs. Encouraged to continue to lose and stop coca cola use. Gabapentin:  start and recheck ECG in 2 weeks. Obesity:  DM . Trying to lose weight. Likely has MICHAELLE  Needs evaluation. LDL: check chol. crestor 10 mg daily. Continue current medications. Stable cardiovascular status.              Mariangel rC MD

## 2023-04-21 RX ORDER — ASPIRIN 81 MG
TABLET,CHEWABLE ORAL
Qty: 90 TABLET | Refills: 3 | Status: SHIPPED | OUTPATIENT
Start: 2023-04-21

## 2023-04-21 NOTE — TELEPHONE ENCOUNTER
Requested Prescriptions     Pending Prescriptions Disp Refills    ASPIRIN LOW DOSE 81 MG chewable tablet [Pharmacy Med Name: ASPIRIN 81 MG CHEWABLE TABLET] 90 tablet 3     Sig: CHEW ONE TABLET BY MOUTH DAILY              Last Office Visit: 3/31/2023     Next Office Visit: 9/15/2023         Last Labs: 21.42.9210 [facility] results

## 2023-04-28 RX ORDER — FAMOTIDINE 20 MG/1
TABLET, FILM COATED ORAL
Qty: 180 TABLET | Refills: 2 | Status: SHIPPED | OUTPATIENT
Start: 2023-04-28

## 2023-06-01 RX ORDER — METOPROLOL SUCCINATE 25 MG/1
TABLET, EXTENDED RELEASE ORAL
Qty: 90 TABLET | Refills: 3 | Status: SHIPPED | OUTPATIENT
Start: 2023-06-01

## 2023-06-01 NOTE — TELEPHONE ENCOUNTER
Requested Prescriptions     Pending Prescriptions Disp Refills    metoprolol succinate (TOPROL XL) 25 MG extended release tablet [Pharmacy Med Name: METOPROLOL SUCC ER 25 MG TAB] 90 tablet 3     Sig: TAKE ONE TABLET BY MOUTH DAILY            Last Office Visit: 3/31/2023     Next Office Visit: 9/15/2023         Last Labs: [phone] results from [facility]

## 2023-10-06 RX ORDER — LISINOPRIL 5 MG/1
5 TABLET ORAL DAILY
Qty: 90 TABLET | Refills: 1 | Status: SHIPPED | OUTPATIENT
Start: 2023-10-06

## 2023-10-06 NOTE — TELEPHONE ENCOUNTER
Requested Prescriptions     Pending Prescriptions Disp Refills    lisinopril (PRINIVIL;ZESTRIL) 5 MG tablet [Pharmacy Med Name: LISINOPRIL 5 MG TABLET] 90 tablet 1     Sig: TAKE ONE TABLET BY MOUTH DAILY          Last Office Visit: 3/31/2023     Next Office Visit: Not schedule

## 2024-01-10 RX ORDER — CLOPIDOGREL BISULFATE 75 MG/1
TABLET ORAL
Qty: 90 TABLET | Refills: 1 | Status: SHIPPED | OUTPATIENT
Start: 2024-01-10

## 2024-01-16 RX ORDER — ISOSORBIDE MONONITRATE 30 MG/1
15 TABLET, EXTENDED RELEASE ORAL DAILY
Qty: 15 TABLET | Refills: 0 | Status: SHIPPED | OUTPATIENT
Start: 2024-01-16

## 2024-02-05 ENCOUNTER — OFFICE VISIT (OUTPATIENT)
Dept: CARDIOLOGY CLINIC | Age: 54
End: 2024-02-05
Payer: COMMERCIAL

## 2024-02-05 VITALS
BODY MASS INDEX: 56.24 KG/M2 | SYSTOLIC BLOOD PRESSURE: 116 MMHG | HEART RATE: 85 BPM | HEIGHT: 59 IN | DIASTOLIC BLOOD PRESSURE: 70 MMHG | WEIGHT: 279 LBS

## 2024-02-05 DIAGNOSIS — I21.4 NSTEMI (NON-ST ELEVATED MYOCARDIAL INFARCTION) (HCC): Primary | ICD-10-CM

## 2024-02-05 PROCEDURE — 4004F PT TOBACCO SCREEN RCVD TLK: CPT | Performed by: INTERNAL MEDICINE

## 2024-02-05 PROCEDURE — 93000 ELECTROCARDIOGRAM COMPLETE: CPT | Performed by: INTERNAL MEDICINE

## 2024-02-05 PROCEDURE — 3017F COLORECTAL CA SCREEN DOC REV: CPT | Performed by: INTERNAL MEDICINE

## 2024-02-05 PROCEDURE — 99213 OFFICE O/P EST LOW 20 MIN: CPT | Performed by: INTERNAL MEDICINE

## 2024-02-05 PROCEDURE — 3074F SYST BP LT 130 MM HG: CPT | Performed by: INTERNAL MEDICINE

## 2024-02-05 PROCEDURE — 3078F DIAST BP <80 MM HG: CPT | Performed by: INTERNAL MEDICINE

## 2024-02-05 PROCEDURE — G8417 CALC BMI ABV UP PARAM F/U: HCPCS | Performed by: INTERNAL MEDICINE

## 2024-02-05 PROCEDURE — G8427 DOCREV CUR MEDS BY ELIG CLIN: HCPCS | Performed by: INTERNAL MEDICINE

## 2024-02-05 PROCEDURE — G8484 FLU IMMUNIZE NO ADMIN: HCPCS | Performed by: INTERNAL MEDICINE

## 2024-02-05 RX ORDER — CLONAZEPAM 0.5 MG/1
TABLET ORAL
COMMUNITY
Start: 2024-01-20

## 2024-02-05 RX ORDER — ANTIPRURITIC (ANTI-ITCH) 1 G/100G
1 OINTMENT TOPICAL DAILY
COMMUNITY
Start: 2024-01-17 | End: 2024-02-16

## 2024-02-05 RX ORDER — NYSTATIN 100000 U/G
CREAM TOPICAL
COMMUNITY
Start: 2023-05-02

## 2024-02-15 RX ORDER — ISOSORBIDE MONONITRATE 30 MG/1
15 TABLET, EXTENDED RELEASE ORAL DAILY
Qty: 15 TABLET | Refills: 0 | Status: SHIPPED | OUTPATIENT
Start: 2024-02-15

## 2024-03-20 RX ORDER — ISOSORBIDE MONONITRATE 30 MG/1
15 TABLET, EXTENDED RELEASE ORAL DAILY
Qty: 90 TABLET | Refills: 3 | Status: SHIPPED | OUTPATIENT
Start: 2024-03-20

## 2024-03-20 RX ORDER — FAMOTIDINE 20 MG/1
TABLET, FILM COATED ORAL
Qty: 180 TABLET | Refills: 3 | Status: SHIPPED | OUTPATIENT
Start: 2024-03-20

## 2024-03-20 NOTE — TELEPHONE ENCOUNTER
Requested Prescriptions     Pending Prescriptions Disp Refills    famotidine (PEPCID) 20 MG tablet [Pharmacy Med Name: FAMOTIDINE 20 MG TABLET] 180 tablet      Sig: TAKE ONE TABLET BY MOUTH TWICE A DAY    isosorbide mononitrate (IMDUR) 30 MG extended release tablet [Pharmacy Med Name: ISOSORBIDE MONONIT ER 30 MG TB] 90 tablet 3     Sig: TAKE 1/2 TABLET BY MOUTH DAILY            Last Office Visit: 2/5/2024     Next Office Visit: 8/5/2024         Last Labs: 12.27.2023

## 2024-03-21 RX ORDER — ROSUVASTATIN CALCIUM 10 MG/1
10 TABLET, COATED ORAL DAILY
Qty: 90 TABLET | Refills: 3 | Status: SHIPPED | OUTPATIENT
Start: 2024-03-21

## 2024-03-21 NOTE — TELEPHONE ENCOUNTER
Requested Prescriptions     Pending Prescriptions Disp Refills    rosuvastatin (CRESTOR) 10 MG tablet 90 tablet 3     Sig: Take 1 tablet by mouth daily            Last Office Visit: 2/5/2024     Next Office Visit: 8/5/2024

## 2024-04-19 RX ORDER — LISINOPRIL 5 MG/1
5 TABLET ORAL DAILY
Qty: 90 TABLET | Refills: 1 | Status: SHIPPED | OUTPATIENT
Start: 2024-04-19

## 2024-05-20 RX ORDER — ASPIRIN 81 MG
TABLET,CHEWABLE ORAL
Qty: 90 TABLET | Refills: 3 | Status: SHIPPED | OUTPATIENT
Start: 2024-05-20

## 2024-05-20 NOTE — TELEPHONE ENCOUNTER
Requested Prescriptions     Pending Prescriptions Disp Refills    ASPIRIN LOW DOSE 81 MG chewable tablet [Pharmacy Med Name: ASPIRIN 81 MG CHEWABLE TABLET] 90 tablet 3     Sig: CHEW ONE TABLET BY MOUTH DAILY              Last Office Visit: 2/5/2024     Next Office Visit: 8/5/2024         Last Labs: 12.27.2023 [facility] results

## 2024-06-05 NOTE — PROGRESS NOTES
CC:  CAD HTN  left shoulder changes.    HPI:       Nanette Guerrero is a 50 y.o. female Nanette Guerrero who is morbidly obese, history of hypertension on propanolol at home, COPD.  CP now gone with stenting..had stent to RCA and no longer has chest pain.   Rare chest pain.  ECG with is normal today.  Pt has reproducible left shoulder pain on palpation.  Gabapentin was precribied.     Histories      Past Medical History:   has a past medical history of Anxiety, COPD (chronic obstructive pulmonary disease) ([facility]), Degenerative disk disease, Depression, High blood pressure, and MRSA (methicillin resistant staph aureus) culture positive.     Surgical History:   has a past surgical history that includes Tubal ligation;  section; and fracture surgery.      Social History:   reports that she has been smoking cigarettes. She has been smoking about 1.50 packs per day. She has never used smokeless tobacco. She reports that she does not drink alcohol or use drugs.      Family History:  No evidence for sudden cardiac death or premature CAD        Medications:         Home Medications  Were reviewed and are listed in nursing record. and/or listed below  Home Medications           Prior to Admission medications    Medication Sig Start Date End Date Taking? Authorizing Provider   tiotropium (SPIRIVA) 18 MCG inhalation capsule Inhale into the lungs daily Inhale 2 puffs by inhalation daily     Yes Historical Provider, MD   ipratropium-albuterol (DUONEB) 0.5-2.5 (3) MG/3ML SOLN nebulizer solution Inhale 1 vial into the lungs every 4 hours     Yes Historical Provider, MD   ibuprofen (ADVIL;MOTRIN) 800 MG tablet Take 800 mg by mouth as needed for Pain     Yes Historical Provider, MD   escitalopram (LEXAPRO) 20 MG tablet Take 1 tablet by mouth daily      Yes Historical Provider, MD   propranolol (INDERAL) 10 MG tablet Take 1 tablet by mouth daily      Yes Historical Provider, MD   QUEtiapine (SEROQUEL) 50 MG tablet Take 150 mg  [FreeTextEntry1] : Type: 2 Severity: moderate (IR 4.2) Duration 10 years: Onset: routine BW Modifying factors: better with medication  Current meds for glycemic control: Glimepiride 1 mg two daily  Metformin 500 mg, 2 tabs daily Pioglitazone 15 mg two daily  jardiance 25 Unable to tolerate rybelsus due to GI side effects Was on Invokana in the past, but caused rash in the groin. No SMBG.   Eye exam- 09/2020 (-)   Exercise: none,  so walks a lot at work recent ear surgery.   DVT, ASHD on imaging, cath did not reveal a need for a stent  s/p resection of a seminoma, and one round of adjuvant carboplatinum. Was told of a low testosterone level.

## 2024-06-26 RX ORDER — METOPROLOL SUCCINATE 25 MG/1
25 TABLET, EXTENDED RELEASE ORAL DAILY
Qty: 90 TABLET | Refills: 3 | Status: SHIPPED | OUTPATIENT
Start: 2024-06-26

## 2024-06-26 NOTE — TELEPHONE ENCOUNTER
Requested Prescriptions     Pending Prescriptions Disp Refills    metoprolol succinate (TOPROL XL) 25 MG extended release tablet 90 tablet 3     Sig: Take 1 tablet by mouth daily            Checked Correct Pharmacy: Yes    Any changes since last refill? No     Last Office Visit: 2/5/2024     Next Office Visit: 8/5/2024

## 2024-07-22 RX ORDER — CLOPIDOGREL BISULFATE 75 MG/1
75 TABLET ORAL DAILY
Qty: 90 TABLET | Refills: 1 | Status: SHIPPED | OUTPATIENT
Start: 2024-07-22

## 2025-01-24 NOTE — PROGRESS NOTES
[facility]     Outpatient Cardiology         Patient Name:  Nanette Guerrero  Primary Care Physician: Marielle Freedman MD  25     Assessment & Plan    Assessment / Plan:     CAD status post PCI to RCA in -no anginal symptoms.  Continue secondary pressure measures.  Continue aspirin and Plavix.  Will consider stopping Plavix on follow-up.  Continue Crestor.  Recheck lipid panel.  Can stop the Imdur.  If patient has any recurrence of chest pain, will consider restarting Imdur.  Sleep disordered breathing-likely has underlying sleep apnea.  Would recommend evaluation with sleep study.  Patient willing to be evaluated.  Will send referral.  Essential hypertension-continue lisinopril and Toprol-XL.  Hyperlipidemia-continue Crestor.  Recheck lipid panel.  May need further adjustment of dose of statin based on lipid panel results.  Continue exercise and weight reduction.  All questions answered.                  Chief Complaint:     Chief Complaint   Patient presents with    Follow-up    Coronary Artery Disease    Hypertension       History of Present Illness:       HPI     Nanette Guerrero is a 54 y.o. female with PMH of COPD, CAD s/p PCI  and HTN here for a follow up.  Previously seen by Dr Graham.    Today she reports she is doing well.  Patient denies any chest pain, shortness of breath, palpitations, presyncope or syncope. No TIA. No claudication. No recent hospitalizations    PMH  Past Medical History:   Diagnosis Date    Anxiety     COPD (chronic obstructive pulmonary disease) (HCC)     Degenerative disk disease     Depression     High blood pressure     MRSA (methicillin resistant staph aureus) culture positive        PSH  Past Surgical History:   Procedure Laterality Date     SECTION      FRACTURE SURGERY      rods and plate in left foot    TUBAL LIGATION          Social HIstory  Social History     Tobacco Use    Smoking status: Every Day     Current

## 2025-01-24 NOTE — PATIENT INSTRUCTIONS
Thank you for choosing [facility] for your cardiac care.    During your visit today, we reviewed and confirmed your cardiac medications along with  medication prescribed by your other healthcare team members. Please be sure to discuss any  changes to medication with your providers.    Please bring a list of ALL medications (or the bottles) with you to EVERY appointment.  Also include vitamins and over-the-counter medications.    If you need refills for any cardiac medications, please call your pharmacy and they will reach out to us electronically.    Did your provider order testing today? If yes, then you will receive your results in three  possible ways. You can receive a [employer] message, a phone call, or letter in the mail. Please  note, if you are an active [employer] user, some of your testing will be available within 1-2 days.    Finally, please know that it is good for your heart to exercise and follow a healthy, low-fat diet  as advised by your physician and health care providers.    If you are experiencing a medical emergency, please call 911 immediately.    It's easy to register for a [employer] account if you don't already have one. With a [employer]  account you can manage your health record, view test results, schedule appointments and  more.     Dr. Serrano's clinical staff can be reached at the following phone number: [phone]    If any cardiac testing is ordered, please contact central scheduling at [phone] to get your test scheduled.

## 2025-01-27 ENCOUNTER — OFFICE VISIT (OUTPATIENT)
Dept: CARDIOLOGY CLINIC | Age: 55
End: 2025-01-27
Payer: COMMERCIAL

## 2025-01-27 VITALS
SYSTOLIC BLOOD PRESSURE: 118 MMHG | WEIGHT: 267 LBS | BODY MASS INDEX: 53.93 KG/M2 | DIASTOLIC BLOOD PRESSURE: 80 MMHG | HEART RATE: 66 BPM | OXYGEN SATURATION: 93 %

## 2025-01-27 DIAGNOSIS — Z98.61 CAD S/P PERCUTANEOUS CORONARY ANGIOPLASTY: Primary | Chronic | ICD-10-CM

## 2025-01-27 DIAGNOSIS — I10 HTN (HYPERTENSION), BENIGN: ICD-10-CM

## 2025-01-27 DIAGNOSIS — E78.5 HYPERLIPIDEMIA, UNSPECIFIED HYPERLIPIDEMIA TYPE: ICD-10-CM

## 2025-01-27 DIAGNOSIS — G47.9 SLEEP DISTURBANCE: ICD-10-CM

## 2025-01-27 DIAGNOSIS — I25.10 CAD S/P PERCUTANEOUS CORONARY ANGIOPLASTY: Primary | Chronic | ICD-10-CM

## 2025-01-27 PROCEDURE — G8427 DOCREV CUR MEDS BY ELIG CLIN: HCPCS | Performed by: INTERNAL MEDICINE

## 2025-01-27 PROCEDURE — 3017F COLORECTAL CA SCREEN DOC REV: CPT | Performed by: INTERNAL MEDICINE

## 2025-01-27 PROCEDURE — 99214 OFFICE O/P EST MOD 30 MIN: CPT | Performed by: INTERNAL MEDICINE

## 2025-01-27 PROCEDURE — 3074F SYST BP LT 130 MM HG: CPT | Performed by: INTERNAL MEDICINE

## 2025-01-27 PROCEDURE — 93000 ELECTROCARDIOGRAM COMPLETE: CPT | Performed by: INTERNAL MEDICINE

## 2025-01-27 PROCEDURE — G8417 CALC BMI ABV UP PARAM F/U: HCPCS | Performed by: INTERNAL MEDICINE

## 2025-01-27 PROCEDURE — 3079F DIAST BP 80-89 MM HG: CPT | Performed by: INTERNAL MEDICINE

## 2025-01-27 PROCEDURE — 4004F PT TOBACCO SCREEN RCVD TLK: CPT | Performed by: INTERNAL MEDICINE

## 2025-01-27 RX ORDER — BUPROPION HYDROCHLORIDE 300 MG/1
TABLET ORAL
COMMUNITY
Start: 2025-01-08

## 2025-01-27 RX ORDER — QUETIAPINE FUMARATE 100 MG/1
TABLET, FILM COATED ORAL
COMMUNITY
Start: 2024-12-26

## 2025-02-07 ENCOUNTER — APPOINTMENT (OUTPATIENT)
Dept: GENERAL RADIOLOGY | Age: 55
DRG: 137 | End: 2025-02-07
Payer: COMMERCIAL

## 2025-02-07 ENCOUNTER — APPOINTMENT (OUTPATIENT)
Dept: CT IMAGING | Age: 55
DRG: 137 | End: 2025-02-07
Payer: COMMERCIAL

## 2025-02-07 ENCOUNTER — HOSPITAL ENCOUNTER (INPATIENT)
Age: 55
LOS: 2 days | Discharge: HOME | DRG: 137 | End: 2025-02-09
Attending: STUDENT IN AN ORGANIZED HEALTH CARE EDUCATION/TRAINING PROGRAM | Admitting: INTERNAL MEDICINE
Payer: COMMERCIAL

## 2025-02-07 DIAGNOSIS — R09.02 HYPOXIA: Primary | ICD-10-CM

## 2025-02-07 PROBLEM — J44.1 COPD EXACERBATION (HCC): Status: ACTIVE | Noted: 2025-02-07

## 2025-02-07 LAB
ALBUMIN SERPL-MCNC: 3.7 G/DL (ref 3.4–5)
ALBUMIN/GLOB SERPL: 0.9 {RATIO} (ref 1.1–2.2)
ALP SERPL-CCNC: 127 U/L (ref 40–129)
ALT SERPL-CCNC: 14 U/L (ref 10–40)
ANION GAP SERPL CALCULATED.3IONS-SCNC: 10 MMOL/L (ref 3–16)
AST SERPL-CCNC: 21 U/L (ref 15–37)
BASE EXCESS BLDV CALC-SCNC: 5.8 MMOL/L (ref -2–3)
BASOPHILS # BLD: 0.1 K/UL (ref 0–0.2)
BASOPHILS NFR BLD: 0.5 %
BILIRUB SERPL-MCNC: 0.6 MG/DL (ref 0–1)
BUN SERPL-MCNC: 8 MG/DL (ref 7–20)
CALCIUM SERPL-MCNC: 9.6 MG/DL (ref 8.3–10.6)
CHLORIDE SERPL-SCNC: 96 MMOL/L (ref 99–110)
CO2 BLDV-SCNC: 35 MMOL/L
CO2 SERPL-SCNC: 29 MMOL/L (ref 21–32)
COHGB MFR BLDV: 4.1 % (ref 0–1.5)
CREAT SERPL-MCNC: 0.9 MG/DL (ref 0.6–1.1)
DEPRECATED RDW RBC AUTO: 16.2 % (ref 12.4–15.4)
DO-HGB MFR BLDV: 7.8 %
EKG ATRIAL RATE: 83 BPM
EKG DIAGNOSIS: NORMAL
EKG P AXIS: 25 DEGREES
EKG P-R INTERVAL: 146 MS
EKG Q-T INTERVAL: 384 MS
EKG QRS DURATION: 70 MS
EKG QTC CALCULATION (BAZETT): 451 MS
EKG R AXIS: 16 DEGREES
EKG T AXIS: 50 DEGREES
EKG VENTRICULAR RATE: 83 BPM
EOSINOPHIL # BLD: 0.1 K/UL (ref 0–0.6)
EOSINOPHIL NFR BLD: 0.8 %
FLUAV RNA RESP QL NAA+PROBE: NOT DETECTED
FLUBV RNA RESP QL NAA+PROBE: NOT DETECTED
GFR SERPLBLD CREATININE-BSD FMLA CKD-EPI: 77 ML/MIN/{1.73_M2}
GLUCOSE SERPL-MCNC: 136 MG/DL (ref 70–99)
HCO3 BLDV-SCNC: 33.3 MMOL/L (ref 24–28)
HCT VFR BLD AUTO: 46.8 % (ref 36–48)
HGB BLD-MCNC: 15.4 G/DL (ref 12–16)
LIPASE SERPL-CCNC: 13 U/L (ref 13–60)
LYMPHOCYTES # BLD: 2.8 K/UL (ref 1–5.1)
LYMPHOCYTES NFR BLD: 17.3 %
MCH RBC QN AUTO: 34.8 PG (ref 26–34)
MCHC RBC AUTO-ENTMCNC: 32.9 G/DL (ref 31–36)
MCV RBC AUTO: 105.9 FL (ref 80–100)
METHGB MFR BLDV: <0 % (ref 0–1.5)
MONOCYTES # BLD: 0.8 K/UL (ref 0–1.3)
MONOCYTES NFR BLD: 5.1 %
NEUTROPHILS # BLD: 12.3 K/UL (ref 1.7–7.7)
NEUTROPHILS NFR BLD: 76.3 %
NT-PROBNP SERPL-MCNC: 980 PG/ML (ref 0–124)
PCO2 BLDV: 58.6 MMHG (ref 41–51)
PH BLDV: 7.36 [PH] (ref 7.35–7.45)
PLATELET # BLD AUTO: 321 K/UL (ref 135–450)
PMV BLD AUTO: 9.4 FL (ref 5–10.5)
PO2 BLDV: 64.2 MMHG (ref 25–40)
POTASSIUM SERPL-SCNC: 4.2 MMOL/L (ref 3.5–5.1)
PROT SERPL-MCNC: 7.6 G/DL (ref 6.4–8.2)
RBC # BLD AUTO: 4.42 M/UL (ref 4–5.2)
SAO2 % BLDV: 92 %
SARS-COV-2 RNA RESP QL NAA+PROBE: NOT DETECTED
SODIUM SERPL-SCNC: 135 MMOL/L (ref 136–145)
TROPONIN, HIGH SENSITIVITY: <6 NG/L (ref 0–14)
TROPONIN, HIGH SENSITIVITY: <6 NG/L (ref 0–14)
WBC # BLD AUTO: 16.1 K/UL (ref 4–11)

## 2025-02-07 PROCEDURE — G0378 HOSPITAL OBSERVATION PER HR: HCPCS

## 2025-02-07 PROCEDURE — 96365 THER/PROPH/DIAG IV INF INIT: CPT

## 2025-02-07 PROCEDURE — 85025 COMPLETE CBC W/AUTO DIFF WBC: CPT

## 2025-02-07 PROCEDURE — 2700000000 HC OXYGEN THERAPY PER DAY

## 2025-02-07 PROCEDURE — 80053 COMPREHEN METABOLIC PANEL: CPT

## 2025-02-07 PROCEDURE — 99285 EMERGENCY DEPT VISIT HI MDM: CPT

## 2025-02-07 PROCEDURE — 93005 ELECTROCARDIOGRAM TRACING: CPT

## 2025-02-07 PROCEDURE — 6360000002 HC RX W HCPCS

## 2025-02-07 PROCEDURE — 94761 N-INVAS EAR/PLS OXIMETRY MLT: CPT

## 2025-02-07 PROCEDURE — 82803 BLOOD GASES ANY COMBINATION: CPT

## 2025-02-07 PROCEDURE — 2580000003 HC RX 258

## 2025-02-07 PROCEDURE — 94640 AIRWAY INHALATION TREATMENT: CPT

## 2025-02-07 PROCEDURE — 71260 CT THORAX DX C+: CPT

## 2025-02-07 PROCEDURE — 84484 ASSAY OF TROPONIN QUANT: CPT

## 2025-02-07 PROCEDURE — 83690 ASSAY OF LIPASE: CPT

## 2025-02-07 PROCEDURE — 71045 X-RAY EXAM CHEST 1 VIEW: CPT

## 2025-02-07 PROCEDURE — 6370000000 HC RX 637 (ALT 250 FOR IP): Performed by: STUDENT IN AN ORGANIZED HEALTH CARE EDUCATION/TRAINING PROGRAM

## 2025-02-07 PROCEDURE — 6360000004 HC RX CONTRAST MEDICATION: Performed by: STUDENT IN AN ORGANIZED HEALTH CARE EDUCATION/TRAINING PROGRAM

## 2025-02-07 PROCEDURE — 96367 TX/PROPH/DG ADDL SEQ IV INF: CPT

## 2025-02-07 PROCEDURE — 2500000003 HC RX 250 WO HCPCS

## 2025-02-07 PROCEDURE — 83880 ASSAY OF NATRIURETIC PEPTIDE: CPT

## 2025-02-07 PROCEDURE — 87636 SARSCOV2 & INF A&B AMP PRB: CPT

## 2025-02-07 PROCEDURE — 36415 COLL VENOUS BLD VENIPUNCTURE: CPT

## 2025-02-07 PROCEDURE — 1200000000 HC SEMI PRIVATE

## 2025-02-07 RX ORDER — IOPAMIDOL 755 MG/ML
75 INJECTION, SOLUTION INTRAVASCULAR
Status: COMPLETED | OUTPATIENT
Start: 2025-02-07 | End: 2025-02-07

## 2025-02-07 RX ORDER — IPRATROPIUM BROMIDE AND ALBUTEROL SULFATE 2.5; .5 MG/3ML; MG/3ML
1 SOLUTION RESPIRATORY (INHALATION)
Status: DISCONTINUED | OUTPATIENT
Start: 2025-02-07 | End: 2025-02-08

## 2025-02-07 RX ORDER — IPRATROPIUM BROMIDE AND ALBUTEROL SULFATE 2.5; .5 MG/3ML; MG/3ML
1 SOLUTION RESPIRATORY (INHALATION)
Status: DISCONTINUED | OUTPATIENT
Start: 2025-02-07 | End: 2025-02-07

## 2025-02-07 RX ADMIN — AZITHROMYCIN MONOHYDRATE 500 MG: 500 INJECTION, POWDER, LYOPHILIZED, FOR SOLUTION INTRAVENOUS at 19:58

## 2025-02-07 RX ADMIN — IPRATROPIUM BROMIDE AND ALBUTEROL SULFATE 1 DOSE: 2.5; .5 SOLUTION RESPIRATORY (INHALATION) at 19:37

## 2025-02-07 RX ADMIN — IPRATROPIUM BROMIDE AND ALBUTEROL SULFATE 1 DOSE: 2.5; .5 SOLUTION RESPIRATORY (INHALATION) at 17:58

## 2025-02-07 RX ADMIN — DOXYCYCLINE 100 MG: 100 INJECTION, POWDER, LYOPHILIZED, FOR SOLUTION INTRAVENOUS at 22:28

## 2025-02-07 RX ADMIN — IOPAMIDOL 75 ML: 755 INJECTION, SOLUTION INTRAVENOUS at 18:20

## 2025-02-07 SDOH — ECONOMIC STABILITY: FOOD INSECURITY: WITHIN THE PAST 12 MONTHS, THE FOOD YOU BOUGHT JUST DIDN'T LAST AND YOU DIDN'T HAVE MONEY TO GET MORE.: NEVER TRUE

## 2025-02-07 SDOH — ECONOMIC STABILITY: INCOME INSECURITY: IN THE LAST 12 MONTHS, WAS THERE A TIME WHEN YOU WERE NOT ABLE TO PAY THE MORTGAGE OR RENT ON TIME?: NO

## 2025-02-07 SDOH — ECONOMIC STABILITY: FOOD INSECURITY: WITHIN THE PAST 12 MONTHS, YOU WORRIED THAT YOUR FOOD WOULD RUN OUT BEFORE YOU GOT MONEY TO BUY MORE.: NEVER TRUE

## 2025-02-07 SDOH — ECONOMIC STABILITY: TRANSPORTATION INSECURITY
IN THE PAST 12 MONTHS, HAS THE LACK OF TRANSPORTATION KEPT YOU FROM MEDICAL APPOINTMENTS OR FROM GETTING MEDICATIONS?: NO

## 2025-02-07 SDOH — ECONOMIC STABILITY: TRANSPORTATION INSECURITY
IN THE PAST 12 MONTHS, HAS LACK OF TRANSPORTATION KEPT YOU FROM MEETINGS, WORK, OR FROM GETTING THINGS NEEDED FOR DAILY LIVING?: NO

## 2025-02-07 ASSESSMENT — LIFESTYLE VARIABLES
HOW MANY STANDARD DRINKS CONTAINING ALCOHOL DO YOU HAVE ON A TYPICAL DAY: PATIENT DOES NOT DRINK
HOW MANY STANDARD DRINKS CONTAINING ALCOHOL DO YOU HAVE ON A TYPICAL DAY: PATIENT DOES NOT DRINK
HOW OFTEN DO YOU HAVE A DRINK CONTAINING ALCOHOL: NEVER
HOW OFTEN DO YOU HAVE A DRINK CONTAINING ALCOHOL: NEVER

## 2025-02-07 ASSESSMENT — SOCIAL DETERMINANTS OF HEALTH (SDOH): HOW HARD IS IT FOR YOU TO PAY FOR THE VERY BASICS LIKE FOOD, HOUSING, MEDICAL CARE, AND HEATING?: NOT HARD AT ALL

## 2025-02-07 ASSESSMENT — PAIN SCALES - GENERAL
PAINLEVEL_OUTOF10: 0
PAINLEVEL_OUTOF10: 0

## 2025-02-07 ASSESSMENT — PAIN - FUNCTIONAL ASSESSMENT: PAIN_FUNCTIONAL_ASSESSMENT: 0-10

## 2025-02-07 NOTE — ED PROVIDER NOTES
THE [facility]  EMERGENCY DEPARTMENT ENCOUNTER          PHYSICIAN ASSISTANT NOTE       Date of evaluation: 2/7/2025    Chief Complaint     Shortness of Breath (Patient stated that she started having chest pain and became diaphoretic, she took Nitro w/ chest relief but increased headache and difficulty breathing, SOB continued today, EMS stated  patients O2 88% on RA. )      History of Present Illness     Nanette Guerrero is a 54 y.o. female with a history of COPD, asthma, CAD, hypertension who presents with shortness of breath.  She states that she started getting chest pain or shortness of breath yesterday, she states that she has her albuterol inhaler at home and felt it helped a little bit.  She states she took nitroglycerin which helped with the chest pain, but not shortness of breath.  She endorses nausea.  She denies any fevers or chills.  She does endorse a productive cough.  She denies any vomiting, diarrhea.  Denies any abdominal pain.    ASSESSMENT / PLAN  (MEDICAL DECISION MAKING)     INITIAL VITALS: BP: 123/70, Temp: 97.8 °F (36.6 °C), Pulse: 84, Respirations: 27, SpO2: (!) 84 %    Nanette Guerrero is a 54 y.o. female who is presenting with shortness of breath.  On exam she is ill-appearing, she is in no acute distress.  She is able to speak in generally complete sentences.  Mildly hypoxic initially so she was placed on 5 L nasal cannula to get her oxygen saturation up to 91%.  On exam she is diffusely decreased breath sounds.  There is no obvious rales or wheezes.  She does have a productive cough frequently during exam.  Her heart is regular rate and rhythm.  Abdomen is soft nontender.  Patient was symptomatically treated with a DuoNeb, and she reports significant breathing improvement with it, her saturation did not improve.  CBC remarkable for leukocytosis of 16.1 no evidence of anemia.  CMP with sodium 135 chloride 96 otherwise no significant electrolyte abnormality.  Liver function normal as  MOUTH DAILY    BUPROPION (WELLBUTRIN XL) 300 MG EXTENDED RELEASE TABLET        CLONAZEPAM (KLONOPIN) 0.5 MG TABLET        CLOPIDOGREL (PLAVIX) 75 MG TABLET    TAKE 1 TABLET BY MOUTH DAILY    ESCITALOPRAM (LEXAPRO) 20 MG TABLET    Take 1 tablet by mouth daily    FAMOTIDINE (PEPCID) 20 MG TABLET    TAKE ONE TABLET BY MOUTH TWICE A DAY    LISINOPRIL (PRINIVIL;ZESTRIL) 5 MG TABLET    TAKE 1 TABLET BY MOUTH DAILY    METOPROLOL SUCCINATE (TOPROL XL) 25 MG EXTENDED RELEASE TABLET    Take 1 tablet by mouth daily    NITROGLYCERIN (NITROSTAT) 0.4 MG SL TABLET    Place 1 tablet under the tongue every 5 minutes as needed for Chest pain    NYSTATIN (MYCOSTATIN) 246404 UNIT/GM CREAM    APPLY TOPICALLY TWO TIMES A DAY    QUETIAPINE (SEROQUEL) 100 MG TABLET        ROSUVASTATIN (CRESTOR) 10 MG TABLET    Take 1 tablet by mouth daily    TIOTROPIUM (SPIRIVA) 18 MCG INHALATION CAPSULE    Inhale into the lungs daily Inhale 2 puffs by inhalation daily       Allergies     She is allergic to adhesive tape, cephalexin, acetaminophen-codeine, codeine, and vancomycin.    Physical Exam     INITIAL VITALS: BP: 123/70, Temp: 97.8 °F (36.6 °C), Pulse: 84, Respirations: 27, SpO2: (!) 84 %  Physical Exam  Vitals and nursing note reviewed.   Constitutional:       General: She is not in acute distress.     Appearance: She is well-developed. She is not ill-appearing.   HENT:      Head: Normocephalic and atraumatic.   Cardiovascular:      Rate and Rhythm: Normal rate and regular rhythm.      Pulses: Normal pulses.      Heart sounds: Normal heart sounds.   Pulmonary:      Breath sounds: Decreased breath sounds present. No wheezing, rhonchi or rales.      Comments: Decreased air movement in all lung fields.  No wheezing or rhonchi.  Able to speak in mostly complete sentences.  Abdominal:      Palpations: Abdomen is soft.      Tenderness: There is no abdominal tenderness. There is no guarding or rebound.   Musculoskeletal:      Right lower leg: No edema.

## 2025-02-07 NOTE — ED PROVIDER NOTES
ED Attending Attestation Note     Date of evaluation: 2/7/2025    This patient was seen by the advance practice provider.  I have seen and examined the patient, agree with the workup, evaluation, management and diagnosis. The care plan has been discussed.  I have reviewed the ECG and concur with the GOPAL's interpretation.  My assessment reveals a woman who is tachypneic and obese presenting emergency department for shortness of breath with new oxygen requirement.  Patient is awake and alert lying on stretcher.  She has minimal minimal scattered wheezes on auscultation.  Patient does not show signs of peripheral edema.  Has new oxygen requirement.  Differential includes pneumonia versus viral infection versus heart failure versus PE.  Will proceed with laboratory evaluation including trended troponins, BNP, CTPA.  Given respiratory status with hypoxic respiratory failure anticipate admission for oxygen weaning.  Will attempt inhaled bronchodilator therapy given her history of COPD        Victoria Fernandez MD  02/08/25 1126

## 2025-02-08 LAB — PROCALCITONIN SERPL IA-MCNC: 0.07 NG/ML (ref 0–0.15)

## 2025-02-08 PROCEDURE — 2500000003 HC RX 250 WO HCPCS: Performed by: INTERNAL MEDICINE

## 2025-02-08 PROCEDURE — 36415 COLL VENOUS BLD VENIPUNCTURE: CPT

## 2025-02-08 PROCEDURE — 6370000000 HC RX 637 (ALT 250 FOR IP): Performed by: INTERNAL MEDICINE

## 2025-02-08 PROCEDURE — 6360000002 HC RX W HCPCS: Performed by: INTERNAL MEDICINE

## 2025-02-08 PROCEDURE — 94761 N-INVAS EAR/PLS OXIMETRY MLT: CPT

## 2025-02-08 PROCEDURE — 1200000000 HC SEMI PRIVATE

## 2025-02-08 PROCEDURE — 84145 PROCALCITONIN (PCT): CPT

## 2025-02-08 PROCEDURE — 2700000000 HC OXYGEN THERAPY PER DAY

## 2025-02-08 PROCEDURE — G0378 HOSPITAL OBSERVATION PER HR: HCPCS

## 2025-02-08 PROCEDURE — 96372 THER/PROPH/DIAG INJ SC/IM: CPT

## 2025-02-08 RX ORDER — ONDANSETRON 4 MG/1
4 TABLET, ORALLY DISINTEGRATING ORAL EVERY 8 HOURS PRN
Status: DISCONTINUED | OUTPATIENT
Start: 2025-02-08 | End: 2025-02-09 | Stop reason: HOSPADM

## 2025-02-08 RX ORDER — NITROGLYCERIN 0.4 MG/1
0.4 TABLET SUBLINGUAL EVERY 5 MIN PRN
Status: DISCONTINUED | OUTPATIENT
Start: 2025-02-08 | End: 2025-02-09 | Stop reason: HOSPADM

## 2025-02-08 RX ORDER — CLONAZEPAM 0.5 MG/1
0.5 TABLET ORAL EVERY 12 HOURS PRN
Status: DISCONTINUED | OUTPATIENT
Start: 2025-02-08 | End: 2025-02-08

## 2025-02-08 RX ORDER — ROSUVASTATIN CALCIUM 10 MG/1
10 TABLET, COATED ORAL DAILY
Status: DISCONTINUED | OUTPATIENT
Start: 2025-02-08 | End: 2025-02-09 | Stop reason: HOSPADM

## 2025-02-08 RX ORDER — ONDANSETRON 2 MG/ML
4 INJECTION INTRAMUSCULAR; INTRAVENOUS EVERY 6 HOURS PRN
Status: DISCONTINUED | OUTPATIENT
Start: 2025-02-08 | End: 2025-02-09 | Stop reason: HOSPADM

## 2025-02-08 RX ORDER — POLYETHYLENE GLYCOL 3350 17 G/17G
17 POWDER, FOR SOLUTION ORAL DAILY PRN
Status: DISCONTINUED | OUTPATIENT
Start: 2025-02-08 | End: 2025-02-09 | Stop reason: HOSPADM

## 2025-02-08 RX ORDER — PREDNISONE 20 MG/1
40 TABLET ORAL DAILY
Status: DISCONTINUED | OUTPATIENT
Start: 2025-02-08 | End: 2025-02-09 | Stop reason: HOSPADM

## 2025-02-08 RX ORDER — ESCITALOPRAM OXALATE 20 MG/1
20 TABLET ORAL DAILY
Status: DISCONTINUED | OUTPATIENT
Start: 2025-02-08 | End: 2025-02-09 | Stop reason: HOSPADM

## 2025-02-08 RX ORDER — GUAIFENESIN/DEXTROMETHORPHAN 100-10MG/5
5 SYRUP ORAL EVERY 4 HOURS PRN
Status: DISCONTINUED | OUTPATIENT
Start: 2025-02-08 | End: 2025-02-09 | Stop reason: HOSPADM

## 2025-02-08 RX ORDER — SODIUM CHLORIDE 9 MG/ML
INJECTION, SOLUTION INTRAVENOUS PRN
Status: DISCONTINUED | OUTPATIENT
Start: 2025-02-08 | End: 2025-02-09 | Stop reason: HOSPADM

## 2025-02-08 RX ORDER — IPRATROPIUM BROMIDE AND ALBUTEROL SULFATE 2.5; .5 MG/3ML; MG/3ML
1 SOLUTION RESPIRATORY (INHALATION)
Status: DISCONTINUED | OUTPATIENT
Start: 2025-02-08 | End: 2025-02-08

## 2025-02-08 RX ORDER — QUETIAPINE FUMARATE 100 MG/1
100 TABLET, FILM COATED ORAL NIGHTLY
Status: DISCONTINUED | OUTPATIENT
Start: 2025-02-08 | End: 2025-02-09 | Stop reason: HOSPADM

## 2025-02-08 RX ORDER — ACETAMINOPHEN 650 MG/1
650 SUPPOSITORY RECTAL EVERY 6 HOURS PRN
Status: DISCONTINUED | OUTPATIENT
Start: 2025-02-08 | End: 2025-02-09 | Stop reason: HOSPADM

## 2025-02-08 RX ORDER — FAMOTIDINE 20 MG/1
20 TABLET, FILM COATED ORAL 2 TIMES DAILY
Status: DISCONTINUED | OUTPATIENT
Start: 2025-02-08 | End: 2025-02-09 | Stop reason: HOSPADM

## 2025-02-08 RX ORDER — ALBUTEROL SULFATE 0.83 MG/ML
2.5 SOLUTION RESPIRATORY (INHALATION)
Status: DISCONTINUED | OUTPATIENT
Start: 2025-02-08 | End: 2025-02-09 | Stop reason: HOSPADM

## 2025-02-08 RX ORDER — LEVOFLOXACIN 500 MG/1
500 TABLET, FILM COATED ORAL DAILY
Status: DISCONTINUED | OUTPATIENT
Start: 2025-02-08 | End: 2025-02-09 | Stop reason: HOSPADM

## 2025-02-08 RX ORDER — SODIUM CHLORIDE 0.9 % (FLUSH) 0.9 %
5-40 SYRINGE (ML) INJECTION EVERY 12 HOURS SCHEDULED
Status: DISCONTINUED | OUTPATIENT
Start: 2025-02-08 | End: 2025-02-09 | Stop reason: HOSPADM

## 2025-02-08 RX ORDER — ENOXAPARIN SODIUM 100 MG/ML
30 INJECTION SUBCUTANEOUS 2 TIMES DAILY
Status: DISCONTINUED | OUTPATIENT
Start: 2025-02-08 | End: 2025-02-09 | Stop reason: HOSPADM

## 2025-02-08 RX ORDER — CLONAZEPAM 0.5 MG/1
0.5 TABLET ORAL DAILY PRN
Status: DISCONTINUED | OUTPATIENT
Start: 2025-02-08 | End: 2025-02-09 | Stop reason: HOSPADM

## 2025-02-08 RX ORDER — CLOPIDOGREL BISULFATE 75 MG/1
75 TABLET ORAL DAILY
Status: DISCONTINUED | OUTPATIENT
Start: 2025-02-08 | End: 2025-02-09 | Stop reason: HOSPADM

## 2025-02-08 RX ORDER — ACETAMINOPHEN 325 MG/1
650 TABLET ORAL EVERY 6 HOURS PRN
Status: DISCONTINUED | OUTPATIENT
Start: 2025-02-08 | End: 2025-02-09 | Stop reason: HOSPADM

## 2025-02-08 RX ORDER — ASPIRIN 81 MG/1
81 TABLET, CHEWABLE ORAL DAILY
Status: DISCONTINUED | OUTPATIENT
Start: 2025-02-08 | End: 2025-02-09 | Stop reason: HOSPADM

## 2025-02-08 RX ORDER — METOPROLOL SUCCINATE 25 MG/1
25 TABLET, EXTENDED RELEASE ORAL DAILY
Status: DISCONTINUED | OUTPATIENT
Start: 2025-02-08 | End: 2025-02-09 | Stop reason: HOSPADM

## 2025-02-08 RX ORDER — LISINOPRIL 5 MG/1
5 TABLET ORAL DAILY
Status: DISCONTINUED | OUTPATIENT
Start: 2025-02-08 | End: 2025-02-08

## 2025-02-08 RX ORDER — IPRATROPIUM BROMIDE AND ALBUTEROL SULFATE 2.5; .5 MG/3ML; MG/3ML
1 SOLUTION RESPIRATORY (INHALATION)
Status: DISCONTINUED | OUTPATIENT
Start: 2025-02-08 | End: 2025-02-09 | Stop reason: HOSPADM

## 2025-02-08 RX ORDER — SODIUM CHLORIDE 0.9 % (FLUSH) 0.9 %
5-40 SYRINGE (ML) INJECTION PRN
Status: DISCONTINUED | OUTPATIENT
Start: 2025-02-08 | End: 2025-02-09 | Stop reason: HOSPADM

## 2025-02-08 RX ORDER — BUPROPION HYDROCHLORIDE 150 MG/1
300 TABLET ORAL DAILY
Status: DISCONTINUED | OUTPATIENT
Start: 2025-02-08 | End: 2025-02-09 | Stop reason: HOSPADM

## 2025-02-08 RX ADMIN — ROSUVASTATIN CALCIUM 10 MG: 10 TABLET, FILM COATED ORAL at 08:45

## 2025-02-08 RX ADMIN — ENOXAPARIN SODIUM 30 MG: 100 INJECTION SUBCUTANEOUS at 19:24

## 2025-02-08 RX ADMIN — SODIUM CHLORIDE, PRESERVATIVE FREE 10 ML: 5 INJECTION INTRAVENOUS at 08:49

## 2025-02-08 RX ADMIN — ENOXAPARIN SODIUM 30 MG: 100 INJECTION SUBCUTANEOUS at 08:46

## 2025-02-08 RX ADMIN — PREDNISONE 40 MG: 20 TABLET ORAL at 08:45

## 2025-02-08 RX ADMIN — ASPIRIN 81 MG: 81 TABLET, CHEWABLE ORAL at 08:45

## 2025-02-08 RX ADMIN — ESCITALOPRAM OXALATE 20 MG: 20 TABLET, FILM COATED ORAL at 08:45

## 2025-02-08 RX ADMIN — ENOXAPARIN SODIUM 30 MG: 100 INJECTION SUBCUTANEOUS at 01:44

## 2025-02-08 RX ADMIN — QUETIAPINE FUMARATE 100 MG: 100 TABLET ORAL at 01:45

## 2025-02-08 RX ADMIN — LEVOFLOXACIN 500 MG: 500 TABLET, FILM COATED ORAL at 01:45

## 2025-02-08 RX ADMIN — FAMOTIDINE 20 MG: 20 TABLET, FILM COATED ORAL at 01:45

## 2025-02-08 RX ADMIN — FAMOTIDINE 20 MG: 20 TABLET, FILM COATED ORAL at 08:45

## 2025-02-08 RX ADMIN — FAMOTIDINE 20 MG: 20 TABLET, FILM COATED ORAL at 19:24

## 2025-02-08 RX ADMIN — QUETIAPINE FUMARATE 100 MG: 100 TABLET ORAL at 19:24

## 2025-02-08 RX ADMIN — SODIUM CHLORIDE, PRESERVATIVE FREE 10 ML: 5 INJECTION INTRAVENOUS at 19:24

## 2025-02-08 RX ADMIN — CLOPIDOGREL BISULFATE 75 MG: 75 TABLET ORAL at 08:45

## 2025-02-08 RX ADMIN — BUPROPION HYDROCHLORIDE 300 MG: 150 TABLET, EXTENDED RELEASE ORAL at 08:45

## 2025-02-08 ASSESSMENT — PAIN SCALES - GENERAL: PAINLEVEL_OUTOF10: 0

## 2025-02-08 NOTE — PLAN OF CARE
4 Eyes Skin Assessment     NAME:  Nanette Guerrero  YOB: 1970  MEDICAL RECORD NUMBER:  [id number]    The patient is being assessed for  Admission    I agree that at least one RN has performed a thorough Head to Toe Skin Assessment on the patient. ALL assessment sites listed below have been assessed.      Areas assessed by both nurses:    Head, Face, Ears, Shoulders, Back, Chest, Arms, Elbows, Hands, Sacrum. Buttock, Coccyx, Ischium, Legs. Feet and Heels, and Under Medical Devices         Does the Patient have a Wound? No noted wound(s)  excoriation on under breast        Lb Prevention initiated by RN: Yes  Wound Care Orders initiated by RN: No    Pressure Injury (Stage 3,4, Unstageable, DTI, NWPT, and Complex wounds) if present, place Wound referral order by RN under : No    New Ostomies, if present place, Ostomy referral order under : No     Nurse 1 eSignature: Electronically signed by Samuel Collado RN on 2/7/25 at 11:15 PM EST    **SHARE this note so that the co-signing nurse can place an eSignature**    Nurse 2 eSignature: Electronically signed by Kasey Bowie RN on 2/8/25 at 3:25 AM EST

## 2025-02-08 NOTE — RT PROTOCOL NOTE
RT Inhaler-Nebulizer Bronchodilator Protocol Note    There is a bronchodilator order in the chart from a provider indicating to follow the RT Bronchodilator Protocol and there is an “Initiate RT Inhaler-Nebulizer Bronchodilator Protocol” order as well (see protocol at bottom of note).    CXR Findings:  XR CHEST PORTABLE    Result Date: 2/7/2025  No acute radiographic abnormality of the chest. Stable nodular changes or granulomata in the right infrahilar lower middle lobe region Electronically signed by Solo Zamora DO      The findings from the last RT Protocol Assessment were as follows:   History Pulmonary Disease: Chronic pulmonary disease  Respiratory Pattern: Regular pattern and RR 12-20 bpm  Breath Sounds: Slightly diminished and/or crackles  Cough: Strong, productive  Indication for Bronchodilator Therapy:    Bronchodilator Assessment Score: 5    Aerosolized bronchodilator medication orders have been revised according to the RT Inhaler-Nebulizer Bronchodilator Protocol below.    Respiratory Therapist to perform RT Therapy Protocol Assessment initially then follow the protocol.  Repeat RT Therapy Protocol Assessment PRN for score 0-3 or on second treatment, BID, and PRN for scores above 3.    No Indications - adjust the frequency to every 6 hours PRN wheezing or bronchospasm, if no treatments needed after 48 hours then discontinue using Per Protocol order mode.     If indication present, adjust the RT bronchodilator orders based on the Bronchodilator Assessment Score as indicated below.  Use Inhaler orders unless patient has one or more of the following: on home nebulizer, not able to hold breath for 10 seconds, is not alert and oriented, cannot activate and use MDI correctly, or respiratory rate 25 breaths per minute or more, then use the equivalent nebulizer order(s) with same Frequency and PRN reasons based on the score.  If a patient is on this medication at home then do not decrease Frequency  below that used at home.    0-3 - enter or revise RT bronchodilator order(s) to equivalent RT Bronchodilator order with Frequency of every 4 hours PRN for wheezing or increased work of breathing using Per Protocol order mode.        4-6 - enter or revise RT Bronchodilator order(s) to two equivalent RT bronchodilator orders with one order with BID Frequency and one order with Frequency of every 4 hours PRN wheezing or increased work of breathing using Per Protocol order mode.        7-10 - enter or revise RT Bronchodilator order(s) to two equivalent RT bronchodilator orders with one order with TID Frequency and one order with Frequency of every 4 hours PRN wheezing or increased work of breathing using Per Protocol order mode.       11-13 - enter or revise RT Bronchodilator order(s) to one equivalent RT bronchodilator order with QID Frequency and an Albuterol order with Frequency of every 4 hours PRN wheezing or increased work of breathing using Per Protocol order mode.      Greater than 13 - enter or revise RT Bronchodilator order(s) to one equivalent RT bronchodilator order with every 4 hours Frequency and an Albuterol order with Frequency of every 2 hours PRN wheezing or increased work of breathing using Per Protocol order mode.     RT to enter RT Home Evaluation for COPD & MDI Assessment order using Per Protocol order mode.    Electronically signed by Anne-Marie Zamora RCP on 2/8/2025 at 11:18 AM

## 2025-02-08 NOTE — ED NOTES
Patient Name: Nanette Guerrero  : 1970 54 y.o.  MRN: [id number]  ED Room #: B15/B15-15     Chief complaint:   Chief Complaint   Patient presents with    Shortness of Breath     Patient stated that she started having chest pain and became diaphoretic, she took Nitro w/ chest relief but increased headache and difficulty breathing, SOB continued today, EMS stated  patients O2 88% on RA.      Hospital Problem/Diagnosis:   Hospital Problems             Last Modified POA    * (Principal) COPD exacerbation (HCC) 2025 Yes         O2 Flow Rate:O2 Device: Nasal cannula O2 Flow Rate (L/min): 5 L/min (if applicable) This is new for the pt. Pt is not on oxygen at baseline.   Cardiac Rhythm:   (if applicable)  Active LDA's:   Peripheral IV 25 Right Forearm (Active)            How does patient ambulate? Low Fall Risk (Ambulates by themselves without support    2. How does patient take pills? Whole with Water    3. Is patient alert? Alert    4. Is patient oriented? To Person, To Place, To Time, To Situation, and Follows Commands    5.   Patient arrived from:  home  Facility Name: ___________________________________________    6. If patient is disoriented or from a Skill Nursing Facility has family been notified of admission? No    7. Patient belongings? Belongings: Cell Phone and Clothing    Disposition of belongings? Kept with Patient     8. Any specific patient or family belongings/needs/dynamics?   a. None    9. Miscellaneous comments/pending orders?  a. None.      If there are any additional questions please reach out to the Emergency Department.      Jeff Callahan RN  [phone]

## 2025-02-08 NOTE — PLAN OF CARE
Problem: Discharge Planning  Goal: Discharge to home or other facility with appropriate resources  2/7/2025 2317 by Samuel Collado RN  Outcome: Progressing  Flowsheets (Taken 2/7/2025 2317)  Discharge to home or other facility with appropriate resources:   Identify barriers to discharge with patient and caregiver   Identify discharge learning needs (meds, wound care, etc)   Refer to discharge planning if patient needs post-hospital services based on physician order or complex needs related to functional status, cognitive ability or social support system     Problem: Pain  Goal: Verbalizes/displays adequate comfort level or baseline comfort level  Outcome: Progressing  Flowsheets (Taken 2/7/2025 2317)  Verbalizes/displays adequate comfort level or baseline comfort level:   Encourage patient to monitor pain and request assistance   Administer analgesics based on type and severity of pain and evaluate response   Consider cultural and social influences on pain and pain management     Problem: Safety - Adult  Goal: Free from fall injury  Flowsheets (Taken 2/7/2025 2317)  Free From Fall Injury:   Instruct family/caregiver on patient safety   Based on caregiver fall risk screen, instruct family/caregiver to ask for assistance with transferring infant if caregiver noted to have fall risk factors     Problem: Skin/Tissue Integrity  Goal: Skin integrity remains intact  Description: 1.  Monitor for areas of redness and/or skin breakdown  2.  Assess vascular access sites hourly  3.  Every 4-6 hours minimum:  Change oxygen saturation probe site  4.  Every 4-6 hours:  If on nasal continuous positive airway pressure, respiratory therapy assess nares and determine need for appliance change or resting period  Outcome: Progressing  Flowsheets (Taken 2/7/2025 2317)  Skin Integrity Remains Intact:   Every 4-6 hours minimum: Change oxygen saturation probe site   Every 4-6 hours: If on nasal continuous positive airway pressure,

## 2025-02-08 NOTE — H&P
Hospital Medicine History & Physical      Date of Admission: 2/7/2025    Date of Service:  Pt seen/examined on 02/08/25     [x]Admitted to Inpatient with expected LOS greater than two midnights due to medical therapy.  []Placed in Observation status.    Chief Admission Complaint: Chest pain and shortness of    Presenting Admission History:      54 y.o. female with PMHx significant for COPD, CAD s/p PCI to RCA, essential hypertension, hyperlipidemia, depression and tobacco use disorder who presented to ED with a complaint of shortness of breath and chest pain.  Patient has been having shortness of breath and cough for the past couple of days.  She did have also some chest discomfort for which she took nitroglycerin sublingually with resolution of chest pain.  She is current chest pain-free.  Patient reports scanty sputum production with her cough.  Patient does not use home oxygen at baseline.  In ED patient was found to be hypoxic with oxygen saturation around 84% on room air.  Patient did require supplemental oxygen via nasal cannula at 5 L/min with improvement of oxygen saturation to 92%.  CTPA was obtained and showed groundglass opacity bilaterally.  Labs were remarkable for WBC count of 16.1 troponin which were negative x 2, BNP of 980.  COVID and influenza A swabs were negative.  Patient is currently being admitted under inpatient status for further management and evaluation..      ROS:     Review of 10 systems is negative except what is outlined in HPI.     Assessment:  Acute exacerbation of COPD.  Possible superimposed bacterial pneumonia  Acute respiratory failure with hypoxia.  Chest pain, resolved.  Coronary artery disease s/p PCI to RCA in 2021.  Essential hypertension.  Hyperlipidemia  Major depressive disorder.  Tobacco use disorder  Morbid obesity, Body mass index is 53.04 kg/m².     Plan:    Patient is admitted under inpatient status.  Continue DuoNeb and steroids.  Empirically add Levaquin,

## 2025-02-08 NOTE — PROGRESS NOTES
completed SBIRT & SDOH screening. Patient reports having no social needs at this time, provided my card and phone number in case anything changes

## 2025-02-09 VITALS
TEMPERATURE: 98.1 F | HEIGHT: 59 IN | DIASTOLIC BLOOD PRESSURE: 70 MMHG | SYSTOLIC BLOOD PRESSURE: 126 MMHG | WEIGHT: 262.6 LBS | RESPIRATION RATE: 18 BRPM | BODY MASS INDEX: 52.94 KG/M2 | OXYGEN SATURATION: 90 % | HEART RATE: 78 BPM

## 2025-02-09 LAB
ALBUMIN SERPL-MCNC: 3.3 G/DL (ref 3.4–5)
ALBUMIN/GLOB SERPL: 0.9 {RATIO} (ref 1.1–2.2)
ALP SERPL-CCNC: 121 U/L (ref 40–129)
ALT SERPL-CCNC: 10 U/L (ref 10–40)
ANION GAP SERPL CALCULATED.3IONS-SCNC: 9 MMOL/L (ref 3–16)
AST SERPL-CCNC: 25 U/L (ref 15–37)
BASOPHILS # BLD: 0 K/UL (ref 0–0.2)
BASOPHILS NFR BLD: 0.3 %
BILIRUB SERPL-MCNC: 0.4 MG/DL (ref 0–1)
BUN SERPL-MCNC: 12 MG/DL (ref 7–20)
CALCIUM SERPL-MCNC: 9.1 MG/DL (ref 8.3–10.6)
CHLORIDE SERPL-SCNC: 100 MMOL/L (ref 99–110)
CO2 SERPL-SCNC: 29 MMOL/L (ref 21–32)
CREAT SERPL-MCNC: 0.8 MG/DL (ref 0.6–1.1)
DEPRECATED RDW RBC AUTO: 16.3 % (ref 12.4–15.4)
EOSINOPHIL # BLD: 0.1 K/UL (ref 0–0.6)
EOSINOPHIL NFR BLD: 0.8 %
GFR SERPLBLD CREATININE-BSD FMLA CKD-EPI: 88 ML/MIN/{1.73_M2}
GLUCOSE SERPL-MCNC: 137 MG/DL (ref 70–99)
HCT VFR BLD AUTO: 43.9 % (ref 36–48)
HGB BLD-MCNC: 14.2 G/DL (ref 12–16)
LYMPHOCYTES # BLD: 2.1 K/UL (ref 1–5.1)
LYMPHOCYTES NFR BLD: 18.7 %
MCH RBC QN AUTO: 34.5 PG (ref 26–34)
MCHC RBC AUTO-ENTMCNC: 32.4 G/DL (ref 31–36)
MCV RBC AUTO: 106.7 FL (ref 80–100)
MONOCYTES # BLD: 0.7 K/UL (ref 0–1.3)
MONOCYTES NFR BLD: 6.1 %
NEUTROPHILS # BLD: 8.2 K/UL (ref 1.7–7.7)
NEUTROPHILS NFR BLD: 74.1 %
PLATELET # BLD AUTO: 259 K/UL (ref 135–450)
PMV BLD AUTO: 9.4 FL (ref 5–10.5)
POTASSIUM SERPL-SCNC: 4.4 MMOL/L (ref 3.5–5.1)
PROT SERPL-MCNC: 7.1 G/DL (ref 6.4–8.2)
RBC # BLD AUTO: 4.11 M/UL (ref 4–5.2)
SODIUM SERPL-SCNC: 138 MMOL/L (ref 136–145)
WBC # BLD AUTO: 11.1 K/UL (ref 4–11)

## 2025-02-09 PROCEDURE — 2700000000 HC OXYGEN THERAPY PER DAY

## 2025-02-09 PROCEDURE — 80053 COMPREHEN METABOLIC PANEL: CPT

## 2025-02-09 PROCEDURE — 6370000000 HC RX 637 (ALT 250 FOR IP): Performed by: STUDENT IN AN ORGANIZED HEALTH CARE EDUCATION/TRAINING PROGRAM

## 2025-02-09 PROCEDURE — G0378 HOSPITAL OBSERVATION PER HR: HCPCS

## 2025-02-09 PROCEDURE — 2500000003 HC RX 250 WO HCPCS: Performed by: INTERNAL MEDICINE

## 2025-02-09 PROCEDURE — 85025 COMPLETE CBC W/AUTO DIFF WBC: CPT

## 2025-02-09 PROCEDURE — 6360000002 HC RX W HCPCS: Performed by: INTERNAL MEDICINE

## 2025-02-09 PROCEDURE — 96372 THER/PROPH/DIAG INJ SC/IM: CPT

## 2025-02-09 PROCEDURE — 94761 N-INVAS EAR/PLS OXIMETRY MLT: CPT

## 2025-02-09 PROCEDURE — 94640 AIRWAY INHALATION TREATMENT: CPT

## 2025-02-09 PROCEDURE — 6370000000 HC RX 637 (ALT 250 FOR IP): Performed by: INTERNAL MEDICINE

## 2025-02-09 PROCEDURE — 36415 COLL VENOUS BLD VENIPUNCTURE: CPT

## 2025-02-09 RX ORDER — IBUPROFEN 400 MG/1
400 TABLET, FILM COATED ORAL EVERY 6 HOURS PRN
Status: DISCONTINUED | OUTPATIENT
Start: 2025-02-09 | End: 2025-02-09 | Stop reason: HOSPADM

## 2025-02-09 RX ORDER — LEVOFLOXACIN 500 MG/1
500 TABLET, FILM COATED ORAL DAILY
Qty: 3 TABLET | Refills: 0 | Status: SHIPPED | OUTPATIENT
Start: 2025-02-10 | End: 2025-02-13

## 2025-02-09 RX ORDER — PREDNISONE 20 MG/1
40 TABLET ORAL DAILY
Qty: 6 TABLET | Refills: 0 | Status: SHIPPED | OUTPATIENT
Start: 2025-02-10 | End: 2025-02-13

## 2025-02-09 RX ADMIN — IBUPROFEN 400 MG: 400 TABLET, FILM COATED ORAL at 10:48

## 2025-02-09 RX ADMIN — IPRATROPIUM BROMIDE AND ALBUTEROL SULFATE 1 DOSE: .5; 2.5 SOLUTION RESPIRATORY (INHALATION) at 08:34

## 2025-02-09 RX ADMIN — ENOXAPARIN SODIUM 30 MG: 100 INJECTION SUBCUTANEOUS at 07:56

## 2025-02-09 RX ADMIN — SODIUM CHLORIDE, PRESERVATIVE FREE 10 ML: 5 INJECTION INTRAVENOUS at 07:58

## 2025-02-09 RX ADMIN — FAMOTIDINE 20 MG: 20 TABLET, FILM COATED ORAL at 07:57

## 2025-02-09 RX ADMIN — PREDNISONE 40 MG: 20 TABLET ORAL at 07:56

## 2025-02-09 RX ADMIN — BUPROPION HYDROCHLORIDE 300 MG: 150 TABLET, EXTENDED RELEASE ORAL at 07:56

## 2025-02-09 RX ADMIN — CLOPIDOGREL BISULFATE 75 MG: 75 TABLET ORAL at 07:57

## 2025-02-09 RX ADMIN — ESCITALOPRAM OXALATE 20 MG: 20 TABLET, FILM COATED ORAL at 07:57

## 2025-02-09 RX ADMIN — LEVOFLOXACIN 500 MG: 500 TABLET, FILM COATED ORAL at 07:57

## 2025-02-09 RX ADMIN — ASPIRIN 81 MG: 81 TABLET, CHEWABLE ORAL at 07:57

## 2025-02-09 RX ADMIN — ROSUVASTATIN CALCIUM 10 MG: 10 TABLET, FILM COATED ORAL at 07:57

## 2025-02-09 ASSESSMENT — PAIN DESCRIPTION - ORIENTATION
ORIENTATION: LOWER
ORIENTATION: LOWER

## 2025-02-09 ASSESSMENT — PAIN DESCRIPTION - LOCATION
LOCATION: BACK
LOCATION: BACK

## 2025-02-09 ASSESSMENT — PAIN DESCRIPTION - DESCRIPTORS
DESCRIPTORS: ACHING
DESCRIPTORS: ACHING

## 2025-02-09 ASSESSMENT — PAIN DESCRIPTION - PAIN TYPE
TYPE: CHRONIC PAIN
TYPE: CHRONIC PAIN

## 2025-02-09 ASSESSMENT — PAIN DESCRIPTION - ONSET
ONSET: ON-GOING
ONSET: ON-GOING

## 2025-02-09 ASSESSMENT — PAIN - FUNCTIONAL ASSESSMENT
PAIN_FUNCTIONAL_ASSESSMENT: PREVENTS OR INTERFERES SOME ACTIVE ACTIVITIES AND ADLS
PAIN_FUNCTIONAL_ASSESSMENT: PREVENTS OR INTERFERES SOME ACTIVE ACTIVITIES AND ADLS

## 2025-02-09 ASSESSMENT — PAIN DESCRIPTION - FREQUENCY
FREQUENCY: CONTINUOUS
FREQUENCY: CONTINUOUS

## 2025-02-09 ASSESSMENT — PAIN SCALES - GENERAL
PAINLEVEL_OUTOF10: 7

## 2025-02-09 NOTE — PLAN OF CARE
Problem: Discharge Planning  Goal: Discharge to home or other facility with appropriate resources  2/8/2025 1944 by Samuel Collado RN  Outcome: Progressing  Flowsheets (Taken 2/7/2025 2317)  Discharge to home or other facility with appropriate resources:   Identify barriers to discharge with patient and caregiver   Identify discharge learning needs (meds, wound care, etc)   Refer to discharge planning if patient needs post-hospital services based on physician order or complex needs related to functional status, cognitive ability or social support system     Problem: Pain  Goal: Verbalizes/displays adequate comfort level or baseline comfort level  2/8/2025 1944 by Samuel Collado RN  Flowsheets (Taken 2/7/2025 2317)  Verbalizes/displays adequate comfort level or baseline comfort level:   Encourage patient to monitor pain and request assistance   Administer analgesics based on type and severity of pain and evaluate response   Consider cultural and social influences on pain and pain management     Problem: Safety - Adult  Goal: Free from fall injury  2/8/2025 1944 by Samuel Collado RN  Outcome: Progressing  Flowsheets (Taken 2/7/2025 2317)  Free From Fall Injury:   Instruct family/caregiver on patient safety   Based on caregiver fall risk screen, instruct family/caregiver to ask for assistance with transferring infant if caregiver noted to have fall risk factors     Problem: Skin/Tissue Integrity  Goal: Skin integrity remains intact  Description: 1.  Monitor for areas of redness and/or skin breakdown  2.  Assess vascular access sites hourly  3.  Every 4-6 hours minimum:  Change oxygen saturation probe site  4.  Every 4-6 hours:  If on nasal continuous positive airway pressure, respiratory therapy assess nares and determine need for appliance change or resting period  2/8/2025 1944 by Samuel Collado RN  Outcome: Progressing  Flowsheets (Taken 2/7/2025 2317)  Skin Integrity Remains Intact:   Every 4-6 hours

## 2025-02-09 NOTE — RT PROTOCOL NOTE
RT Nebulizer Bronchodilator Protocol Note    There is a bronchodilator order in the chart from a provider indicating to follow the RT Bronchodilator Protocol and there is an “Initiate RT Bronchodilator Protocol” order as well (see protocol at bottom of note).    CXR Findings:  XR CHEST PORTABLE    Result Date: 2/7/2025  No acute radiographic abnormality of the chest. Stable nodular changes or granulomata in the right infrahilar lower middle lobe region Electronically signed by Solo Zamora DO      The findings from the last RT Protocol Assessment were as follows:  Smoking: Chronic pulmonary disease  Respiratory Pattern: Regular pattern and RR 12-20 bpm  Breath Sounds: Slightly diminished and/or crackles  Cough: Strong, spontaneous, non-productive  Indication for Bronchodilator Therapy:    Bronchodilator Assessment Score: 4    Aerosolized bronchodilator medication orders have been revised according to the RT Nebulizer Bronchodilator Protocol below.    Respiratory Therapist to perform RT Therapy Protocol Assessment initially then follow the protocol.  Repeat RT Therapy Protocol Assessment PRN for score 0-3 or on second treatment, BID, and PRN for scores above 3.    No Indications - adjust the frequency to every 6 hours PRN wheezing or bronchospasm, if no treatments needed after 48 hours then discontinue using Per Protocol order mode.     If indication present, adjust the RT bronchodilator orders based on the Bronchodilator Assessment Score as indicated below.  If a patient is on this medication at home then do not decrease Frequency below that used at home.    0-3 - enter or revise RT bronchodilator order(s) to equivalent RT Bronchodilator order with Frequency of every 4 hours PRN for wheezing or increased work of breathing using Per Protocol order mode.       4-6 - enter or revise RT Bronchodilator order(s) to two equivalent RT bronchodilator orders with one order with BID Frequency and one order with

## 2025-02-09 NOTE — DISCHARGE SUMMARY
V2.0  Discharge Summary    Name:  Nanette Guerrero /Age/Sex: 1970 (54 y.o. female)   Admit Date: 2025  Discharge Date: 25    MRN & CSN:  [id number] & [id number] Encounter Date and Time 25 1:07 PM EST    Attending:  Rudi Dowell* Discharging Provider: Rudi March MD       Hospital Course:     54 y.o. female with PMHx significant for COPD, CAD s/p PCI to RCA, essential hypertension, hyperlipidemia, depression and tobacco use disorder who presented to ED with a complaint of shortness of breath and chest pain.  Patient has been having shortness of breath and cough for the past couple of days.  She did have also some chest discomfort for which she took nitroglycerin sublingually with resolution of chest pain. Patient reports scanty sputum production with her cough.  Patient does not use home oxygen at baseline.  In ED patient was found to be hypoxic with oxygen saturation around 84% on room air.  Patient did require supplemental oxygen via nasal cannula at 5 L/min with improvement of oxygen saturation to 92%.  CTPA was obtained and showed groundglass opacity bilaterally.  Labs were remarkable for WBC count of 16.1 troponin which were negative x 2, BNP of 980.  COVID and influenza A swabs were negative.  Received Levaquin, Nebs, prednisone pulse with improvement of symptoms. Was able to be completely weaned off oxygen. Advised to quit smoking. On 2025 patient was deemed fit for discharge.     Acute hypoxic respiratory failure  Acute COPD exacerbation  -Weaned off oxygen completely. Prednisone Pulse to be completed through . Continue home nebs. Referral to Pulmonology.     Possible CAP  -Complete 5-day course of Levaquin through 2025. Needs repeat imaging in about 6 weeks.     HTN    Tobacco abuse  -Highly recommend complete cessation.     CAD s/p stenting to RCA  -Continue home meds.     Morbid obesity    The patient expressed appropriate understanding of, and

## 2025-02-10 ENCOUNTER — TELEPHONE (OUTPATIENT)
Dept: CARDIOLOGY CLINIC | Age: 55
End: 2025-02-10

## 2025-02-10 RX ORDER — CLOPIDOGREL BISULFATE 75 MG/1
75 TABLET ORAL DAILY
Qty: 90 TABLET | Refills: 3 | Status: SHIPPED | OUTPATIENT
Start: 2025-02-10

## 2025-02-10 NOTE — TELEPHONE ENCOUNTER
Rx refill request    Medication  clopidogrel (PLAVIX) 75 MG tablet [[id number]]  clopidogrel (PLAVIX) 75 MG tablet [[id number]]    Order Details  Dose: 75 mg Route: Oral Frequency: DAILY   Dispense Quantity: 90 tablet Refills: 1          Sig: TAKE 1 TABLET BY MOUTH DAILY       Pharmacy    [facility] [id number] [facility] [address] - P [phone] - F [phone]  [facility] [address] [facility] [address]  Phone: [phone]  Fax: [phone]

## 2025-02-24 NOTE — PROGRESS NOTES
Physician Progress Note      PATIENT:               JESSICA ALLRED  [facility] #:                  [id number]  :                       1970  ADMIT DATE:       2025 5:12 PM  DISCH DATE:        2025 2:40 PM  RESPONDING  PROVIDER #:        Rudi March MD          QUERY TEXT:    Pt admitted with COPD exacerbation. Pt noted to have \"possible PNA\". If   possible, please document in the progress notes and discharge summary if you   are evaluating and/or treating any of the following:    Note: CAP and HCAP indicate where the pneumonia was acquired, not a specific   type.    The medical record reflects the following:  Risk Factors: COPD, smoker    Clinical Indicators:  DCS- \"Possible CAP. CTPA was obtained and showed   groundglass opacity bilaterally. Complete 5-day course of Levaquin through   2025. Needs repeat imaging in about 6 weeks.\"      Treatment: imaging, IV Levaquin, dc on oral Levaquin  Options provided:  -- Possible gram negative pneumonia  -- Other - I will add my own diagnosis  -- Disagree - Not applicable / Not valid  -- Disagree - Clinically unable to determine / Unknown  -- Refer to Clinical Documentation Reviewer    PROVIDER RESPONSE TEXT:    This patient has possible gram negative pneumonia.    Query created by: Roxana Lees on 2025 11:42 AM      QUERY TEXT:    Patient admitted with COPD exacerbation. Noted documentation of acute   respiratory failure in  HP. Per ED documentation \"she is in no acute   distress.  She is able to speak in generally complete sentences.\" In order to   support the diagnosis of acute respiratory failure, please include additional   clinical indicators in your documentation.  Or please document if the   diagnosis of acute respiratory failure has been ruled out after further study.    The medical record reflects the following:  Risk Factors: COPD exacerbation, smoker    Clinical Indicators:  ED notes- \" she is in no acute distress.

## 2025-03-28 RX ORDER — METOPROLOL SUCCINATE 25 MG/1
25 TABLET, EXTENDED RELEASE ORAL DAILY
Qty: 90 TABLET | Refills: 3 | Status: SHIPPED | OUTPATIENT
Start: 2025-03-28

## 2025-03-28 RX ORDER — LISINOPRIL 5 MG/1
5 TABLET ORAL DAILY
Qty: 90 TABLET | Refills: 3 | Status: SHIPPED | OUTPATIENT
Start: 2025-03-28

## 2025-03-28 RX ORDER — ASPIRIN 81 MG/1
81 TABLET, CHEWABLE ORAL DAILY
Qty: 90 TABLET | Refills: 3 | Status: SHIPPED | OUTPATIENT
Start: 2025-03-28

## 2025-03-28 RX ORDER — ROSUVASTATIN CALCIUM 10 MG/1
10 TABLET, COATED ORAL DAILY
Qty: 90 TABLET | Refills: 3 | Status: SHIPPED | OUTPATIENT
Start: 2025-03-28

## 2025-03-28 RX ORDER — FAMOTIDINE 20 MG/1
20 TABLET, FILM COATED ORAL 2 TIMES DAILY
Qty: 180 TABLET | Refills: 3 | Status: SHIPPED | OUTPATIENT
Start: 2025-03-28

## 2025-03-28 NOTE — TELEPHONE ENCOUNTER
Medication refill:     Medication  metoprolol succinate (TOPROL XL) 25 MG extended release tablet [[id number]]  metoprolol succinate (TOPROL XL) 25 MG extended release tablet [[id number]]    Order Details  Dose: 25 mg Route: Oral Frequency: DAILY   Dispense Quantity: 90 tablet Refills: 3          Sig: Take 1 tablet by mouth daily   REFILL: 6/26/24    Medication  ASPIRIN LOW DOSE 81 MG chewable tablet [[id number]]  ASPIRIN LOW DOSE 81 MG chewable tablet [[id number]]    Order Details  Dose, Route, Frequency: As Directed   Dispense Quantity: 90 tablet Refills: 3          Sig: CHEW ONE TABLET BY MOUTH DAILY   REFILL: 5/20/24    Medication  lisinopril (PRINIVIL;ZESTRIL) 5 MG tablet [[id number]]  lisinopril (PRINIVIL;ZESTRIL) 5 MG tablet [[id number]]    Order Details  Dose: 5 mg Route: Oral Frequency: DAILY   Dispense Quantity: 90 tablet Refills: 1          Sig: TAKE 1 TABLET BY MOUTH DAILY   REFILL: 4/19/24    Medication  rosuvastatin (CRESTOR) 10 MG tablet [[id number]]  rosuvastatin (CRESTOR) 10 MG tablet [[id number]]    Order Details  Dose: 10 mg Route: Oral Frequency: DAILY   Dispense Quantity: 90 tablet Refills: 3          Sig: Take 1 tablet by mouth daily   REFILL: 3/21/24    Medication  famotidine (PEPCID) 20 MG tablet [[id number]]  famotidine (PEPCID) 20 MG tablet [[id number]]    Order Details  Dose, Route, Frequency: As Directed   Dispense Quantity: 180 tablet Refills: 3          Sig: TAKE ONE TABLET BY MOUTH TWICE A DAY   REFILL: 3/20/24    [facility] [id number] [facility] [address]  - P [phone] - F [phone]  [facility] [facility] [address]  Phone: [phone]  Fax: [phone]       Last visit:  1/27/25  Next visit: 7/30/25

## 2025-04-17 RX ORDER — NITROGLYCERIN 0.4 MG/1
0.4 TABLET SUBLINGUAL EVERY 5 MIN PRN
Qty: 25 TABLET | Refills: 3 | Status: SHIPPED | OUTPATIENT
Start: 2025-04-17

## 2025-04-17 NOTE — TELEPHONE ENCOUNTER
Requested Prescriptions     Pending Prescriptions Disp Refills    nitroGLYCERIN (NITROSTAT) 0.4 MG SL tablet 25 tablet 3     Sig: Place 1 tablet under the tongue every 5 minutes as needed for Chest pain            Checked Correct Pharmacy: Yes    Any changes since last refill? No     Number: 25  Refills: 3    Last OV: 1/27/2025 Provider: LEONA    Next OV: 7/30/2025 Provider: LEONA    Last Labs:   CBC:   Lab Results   Component Value Date    WBC 11.1 (H) 02/09/2025    HGB 14.2 02/09/2025    HCT 43.9 02/09/2025    .7 (H) 02/09/2025     02/09/2025     CMP:   Lab Results   Component Value Date     02/09/2025    K 4.4 02/09/2025     02/09/2025    CO2 29 02/09/2025    BUN 12 02/09/2025    CREATININE 0.8 02/09/2025    GLUCOSE 137 (H) 02/09/2025    CALCIUM 9.1 02/09/2025    BILITOT 0.4 02/09/2025    ALKPHOS 121 02/09/2025    AST 25 02/09/2025    ALT 10 02/09/2025    LABGLOM 88 02/09/2025    GFRAA >60 03/24/2021    AGRATIO 0.9 (L) 02/09/2025    GLOB 3.6 03/23/2021          BNP: No results found for: \"BNP\"       
[facility] Medication Refills:    Medication  nitroGLYCERIN (NITROSTAT) 0.4 MG SL tablet [[id number]]  nitroGLYCERIN (NITROSTAT) 0.4 MG SL tablet [[id number]]    Order Details  Dose: 0.4 mg Route: SubLINGual Frequency: EVERY 5 MIN PRN for Chest pain   Dispense Quantity: 25 tablet Refills: 3          Sig: Place 1 tablet under the tongue every 5 minutes as needed for Chest pain       Pharmacy    [facility] [id number] - [place] [address] [address] - P [phone] - F [phone]  [facility] [facility] [address]  Phone: [phone]  Fax: [phone]       Last Office Visit: 01/27/25    Next Office Visit: 07/30/25    Last Refill: 01/10/22    Last Labs: 02/09/25  
n/a

## 2025-06-04 ENCOUNTER — APPOINTMENT (OUTPATIENT)
Dept: CT IMAGING | Age: 55
End: 2025-06-04

## 2025-06-04 ENCOUNTER — HOSPITAL ENCOUNTER (EMERGENCY)
Age: 55
Discharge: HOME | End: 2025-06-04
Attending: EMERGENCY MEDICINE
Payer: MEDICARE

## 2025-06-04 VITALS
SYSTOLIC BLOOD PRESSURE: 162 MMHG | OXYGEN SATURATION: 93 % | HEIGHT: 60 IN | TEMPERATURE: 98 F | HEART RATE: 90 BPM | WEIGHT: 279.76 LBS | RESPIRATION RATE: 20 BRPM | DIASTOLIC BLOOD PRESSURE: 46 MMHG | BODY MASS INDEX: 54.93 KG/M2

## 2025-06-04 DIAGNOSIS — H92.02 OTALGIA OF LEFT EAR: ICD-10-CM

## 2025-06-04 DIAGNOSIS — R51.9 NONINTRACTABLE EPISODIC HEADACHE, UNSPECIFIED HEADACHE TYPE: ICD-10-CM

## 2025-06-04 DIAGNOSIS — H65.00 ACUTE SEROUS OTITIS MEDIA, RECURRENCE NOT SPECIFIED, UNSPECIFIED LATERALITY: Primary | ICD-10-CM

## 2025-06-04 DIAGNOSIS — R73.9 HYPERGLYCEMIA: ICD-10-CM

## 2025-06-04 LAB
ALBUMIN SERPL-MCNC: 3.7 G/DL (ref 3.4–5)
ALBUMIN/GLOB SERPL: 1 {RATIO} (ref 1.1–2.2)
ALP SERPL-CCNC: 118 U/L (ref 40–129)
ALT SERPL-CCNC: 23 U/L (ref 10–40)
ANION GAP SERPL CALCULATED.3IONS-SCNC: 10 MMOL/L (ref 3–16)
AST SERPL-CCNC: 28 U/L (ref 15–37)
BASOPHILS # BLD: 0.2 K/UL (ref 0–0.2)
BASOPHILS NFR BLD: 1.9 %
BILIRUB SERPL-MCNC: 0.4 MG/DL (ref 0–1)
BUN SERPL-MCNC: 8 MG/DL (ref 7–20)
CALCIUM SERPL-MCNC: 9.2 MG/DL (ref 8.3–10.6)
CHLORIDE SERPL-SCNC: 98 MMOL/L (ref 99–110)
CO2 SERPL-SCNC: 26 MMOL/L (ref 21–32)
CREAT SERPL-MCNC: 1.1 MG/DL (ref 0.6–1.1)
D-DIMER QUANTITATIVE: <0.27 UG/ML FEU (ref 0–0.6)
DEPRECATED RDW RBC AUTO: 17.1 % (ref 12.4–15.4)
EKG ATRIAL RATE: 87 BPM
EKG DIAGNOSIS: NORMAL
EKG P AXIS: 32 DEGREES
EKG P-R INTERVAL: 184 MS
EKG Q-T INTERVAL: 364 MS
EKG QRS DURATION: 72 MS
EKG QTC CALCULATION (BAZETT): 438 MS
EKG R AXIS: 11 DEGREES
EKG T AXIS: 44 DEGREES
EKG VENTRICULAR RATE: 87 BPM
EOSINOPHIL # BLD: 0.2 K/UL (ref 0–0.6)
EOSINOPHIL NFR BLD: 2.2 %
GFR SERPLBLD CREATININE-BSD FMLA CKD-EPI: 60 ML/MIN/{1.73_M2}
GLUCOSE SERPL-MCNC: 302 MG/DL (ref 70–99)
HCT VFR BLD AUTO: 42.1 % (ref 36–48)
HGB BLD-MCNC: 13.9 G/DL (ref 12–16)
LACTATE BLDV-SCNC: 3.5 MMOL/L (ref 0.4–1.9)
LYMPHOCYTES # BLD: 1.5 K/UL (ref 1–5.1)
LYMPHOCYTES NFR BLD: 14.5 %
MCH RBC QN AUTO: 33.6 PG (ref 26–34)
MCHC RBC AUTO-ENTMCNC: 32.9 G/DL (ref 31–36)
MCV RBC AUTO: 102.1 FL (ref 80–100)
MONOCYTES # BLD: 0.4 K/UL (ref 0–1.3)
MONOCYTES NFR BLD: 3.7 %
NEUTROPHILS # BLD: 8.1 K/UL (ref 1.7–7.7)
NEUTROPHILS NFR BLD: 77.7 %
NT-PROBNP SERPL-MCNC: 352 PG/ML (ref 0–124)
PLATELET # BLD AUTO: 199 K/UL (ref 135–450)
PMV BLD AUTO: 9.4 FL (ref 5–10.5)
POTASSIUM SERPL-SCNC: 4.5 MMOL/L (ref 3.5–5.1)
PROT SERPL-MCNC: 7.3 G/DL (ref 6.4–8.2)
RBC # BLD AUTO: 4.12 M/UL (ref 4–5.2)
SODIUM SERPL-SCNC: 134 MMOL/L (ref 136–145)
TROPONIN, HIGH SENSITIVITY: <6 NG/L (ref 0–14)
WBC # BLD AUTO: 10.5 K/UL (ref 4–11)

## 2025-06-04 PROCEDURE — 96374 THER/PROPH/DIAG INJ IV PUSH: CPT

## 2025-06-04 PROCEDURE — 93005 ELECTROCARDIOGRAM TRACING: CPT | Performed by: EMERGENCY MEDICINE

## 2025-06-04 PROCEDURE — 6360000002 HC RX W HCPCS: Performed by: EMERGENCY MEDICINE

## 2025-06-04 PROCEDURE — 96375 TX/PRO/DX INJ NEW DRUG ADDON: CPT

## 2025-06-04 PROCEDURE — 85379 FIBRIN DEGRADATION QUANT: CPT

## 2025-06-04 PROCEDURE — 99285 EMERGENCY DEPT VISIT HI MDM: CPT

## 2025-06-04 PROCEDURE — 84484 ASSAY OF TROPONIN QUANT: CPT

## 2025-06-04 PROCEDURE — 93010 ELECTROCARDIOGRAM REPORT: CPT | Performed by: INTERNAL MEDICINE

## 2025-06-04 PROCEDURE — 85025 COMPLETE CBC W/AUTO DIFF WBC: CPT

## 2025-06-04 PROCEDURE — 6360000004 HC RX CONTRAST MEDICATION: Performed by: EMERGENCY MEDICINE

## 2025-06-04 PROCEDURE — 83880 ASSAY OF NATRIURETIC PEPTIDE: CPT

## 2025-06-04 PROCEDURE — 70450 CT HEAD/BRAIN W/O DYE: CPT

## 2025-06-04 PROCEDURE — 83605 ASSAY OF LACTIC ACID: CPT

## 2025-06-04 PROCEDURE — 70496 CT ANGIOGRAPHY HEAD: CPT

## 2025-06-04 PROCEDURE — 80053 COMPREHEN METABOLIC PANEL: CPT

## 2025-06-04 RX ORDER — IOPAMIDOL 755 MG/ML
75 INJECTION, SOLUTION INTRAVASCULAR
Status: COMPLETED | OUTPATIENT
Start: 2025-06-04 | End: 2025-06-04

## 2025-06-04 RX ORDER — ONDANSETRON 2 MG/ML
4 INJECTION INTRAMUSCULAR; INTRAVENOUS EVERY 30 MIN PRN
Status: DISCONTINUED | OUTPATIENT
Start: 2025-06-04 | End: 2025-06-04 | Stop reason: HOSPADM

## 2025-06-04 RX ORDER — NAPROXEN 500 MG/1
500 TABLET ORAL 2 TIMES DAILY PRN
Qty: 20 TABLET | Refills: 0 | Status: SHIPPED | OUTPATIENT
Start: 2025-06-04 | End: 2025-06-14

## 2025-06-04 RX ORDER — KETOROLAC TROMETHAMINE 30 MG/ML
30 INJECTION, SOLUTION INTRAMUSCULAR; INTRAVENOUS ONCE
Status: COMPLETED | OUTPATIENT
Start: 2025-06-04 | End: 2025-06-04

## 2025-06-04 RX ORDER — AZITHROMYCIN 250 MG/1
TABLET, FILM COATED ORAL
Qty: 6 TABLET | Refills: 0 | Status: SHIPPED | OUTPATIENT
Start: 2025-06-04 | End: 2025-06-14

## 2025-06-04 RX ADMIN — ONDANSETRON 4 MG: 2 INJECTION, SOLUTION INTRAMUSCULAR; INTRAVENOUS at 12:01

## 2025-06-04 RX ADMIN — KETOROLAC TROMETHAMINE 30 MG: 30 INJECTION, SOLUTION INTRAMUSCULAR at 12:01

## 2025-06-04 RX ADMIN — IOPAMIDOL 75 ML: 755 INJECTION, SOLUTION INTRAVENOUS at 11:50

## 2025-06-04 ASSESSMENT — ENCOUNTER SYMPTOMS
RHINORRHEA: 0
SINUS PRESSURE: 0
PHOTOPHOBIA: 0
COLOR CHANGE: 0
SORE THROAT: 0
TROUBLE SWALLOWING: 0
VOICE CHANGE: 0
STRIDOR: 0
ABDOMINAL DISTENTION: 0
BACK PAIN: 0
EYE REDNESS: 0
BLOOD IN STOOL: 0
DIARRHEA: 0
EYE DISCHARGE: 0
CONSTIPATION: 0
COUGH: 0
SHORTNESS OF BREATH: 0
CHEST TIGHTNESS: 0
RECTAL PAIN: 0
APNEA: 0
VOMITING: 0
ABDOMINAL PAIN: 0
NAUSEA: 0
EYE PAIN: 0
WHEEZING: 0

## 2025-06-04 ASSESSMENT — PAIN SCALES - GENERAL: PAINLEVEL_OUTOF10: 9

## 2025-06-04 ASSESSMENT — PAIN DESCRIPTION - ORIENTATION: ORIENTATION: LEFT

## 2025-06-04 ASSESSMENT — PAIN DESCRIPTION - PAIN TYPE: TYPE: ACUTE PAIN

## 2025-06-04 ASSESSMENT — PAIN DESCRIPTION - DESCRIPTORS: DESCRIPTORS: SHOOTING;SHARP

## 2025-06-04 ASSESSMENT — PAIN DESCRIPTION - LOCATION: LOCATION: EAR

## 2025-06-04 ASSESSMENT — PAIN - FUNCTIONAL ASSESSMENT: PAIN_FUNCTIONAL_ASSESSMENT: 0-10

## 2025-06-04 NOTE — DISCHARGE INSTRUCTIONS
Watch the sugar and carbohydrates in your diet and monitor your blood sugar carefully.   Tylenol and/or Ibuprofen as needed, as directed for fever and/or pain.  Return if symptoms change or worsen.

## 2025-06-04 NOTE — ED PROVIDER NOTES
Nanette Guerrero is a 55 year old female with a history of HTN and COPD who presents to the ED with dizziness and increased shortness of breath since yesterday mid-afternoon. Today she was awakened by severe pain on the left side of her neck, face and peg-auricular area. She opined that she might have an ear infection so she took Tylenol and some ear drops with out relief. She now states she has a \"migraine headache\" of the entire left side of her head. She appears very uncomfortable, tearful, holding the left side of her face. Pain is described as 9/10. She denies chest pain, though she has a history of CAD.      BP (!) 165/50   Pulse 89   Temp 98 °F (36.7 °C) (Oral)   Resp 18   Ht 1.511 m (4' 11.5\")   Wt 126.9 kg (279 lb 12.2 oz)   SpO2 97%   BMI 55.56 kg/m²     I have reviewed the following from the nursing documentation:      Prior to Admission medications    Medication Sig Start Date End Date Taking? Authorizing Provider   nitroGLYCERIN (NITROSTAT) 0.4 MG SL tablet Place 1 tablet under the tongue every 5 minutes as needed for Chest pain 4/17/25   Pérez Serrano MD   aspirin (ASPIRIN LOW DOSE) 81 MG chewable tablet Take 1 tablet by mouth daily 3/28/25   Pérez Serrano MD   metoprolol succinate (TOPROL XL) 25 MG extended release tablet Take 1 tablet by mouth daily 3/28/25   Pérez Serrano MD   lisinopril (PRINIVIL;ZESTRIL) 5 MG tablet Take 1 tablet by mouth daily 3/28/25   Pérez Serrano MD   rosuvastatin (CRESTOR) 10 MG tablet Take 1 tablet by mouth daily 3/28/25   Pérez Serrano MD   famotidine (PEPCID) 20 MG tablet Take 1 tablet by mouth 2 times daily 3/28/25   Pérez Serrano MD   clopidogrel (PLAVIX) 75 MG tablet Take 1 tablet by mouth daily 2/10/25   Pérez Serrano MD   QUEtiapine (SEROQUEL) 100 MG tablet  12/26/24   ProviderJazmín MD   buPROPion (WELLBUTRIN XL) 300 MG extended release tablet  1/8/25   Provider, MD Jazmín   ALBUTEROL IN Inhale into the

## 2025-07-02 ENCOUNTER — OFFICE VISIT (OUTPATIENT)
Dept: PULMONOLOGY | Age: 55
End: 2025-07-02
Payer: MEDICARE

## 2025-07-02 VITALS
BODY MASS INDEX: 56.04 KG/M2 | OXYGEN SATURATION: 96 % | HEIGHT: 59 IN | WEIGHT: 278 LBS | SYSTOLIC BLOOD PRESSURE: 110 MMHG | DIASTOLIC BLOOD PRESSURE: 66 MMHG | HEART RATE: 71 BPM

## 2025-07-02 DIAGNOSIS — Z72.0 TOBACCO ABUSE: ICD-10-CM

## 2025-07-02 DIAGNOSIS — R06.83 SNORING: ICD-10-CM

## 2025-07-02 DIAGNOSIS — J44.89 ASTHMA-COPD OVERLAP SYNDROME (HCC): Primary | ICD-10-CM

## 2025-07-02 DIAGNOSIS — E66.813 CLASS 3 SEVERE OBESITY DUE TO EXCESS CALORIES WITH SERIOUS COMORBIDITY AND BODY MASS INDEX (BMI) OF 50.0 TO 59.9 IN ADULT (HCC): Chronic | ICD-10-CM

## 2025-07-02 PROCEDURE — 3078F DIAST BP <80 MM HG: CPT | Performed by: INTERNAL MEDICINE

## 2025-07-02 PROCEDURE — G8427 DOCREV CUR MEDS BY ELIG CLIN: HCPCS | Performed by: INTERNAL MEDICINE

## 2025-07-02 PROCEDURE — 4004F PT TOBACCO SCREEN RCVD TLK: CPT | Performed by: INTERNAL MEDICINE

## 2025-07-02 PROCEDURE — G8417 CALC BMI ABV UP PARAM F/U: HCPCS | Performed by: INTERNAL MEDICINE

## 2025-07-02 PROCEDURE — 99406 BEHAV CHNG SMOKING 3-10 MIN: CPT | Performed by: INTERNAL MEDICINE

## 2025-07-02 PROCEDURE — 99204 OFFICE O/P NEW MOD 45 MIN: CPT | Performed by: INTERNAL MEDICINE

## 2025-07-02 PROCEDURE — 3074F SYST BP LT 130 MM HG: CPT | Performed by: INTERNAL MEDICINE

## 2025-07-02 PROCEDURE — G2211 COMPLEX E/M VISIT ADD ON: HCPCS | Performed by: INTERNAL MEDICINE

## 2025-07-02 PROCEDURE — 3017F COLORECTAL CA SCREEN DOC REV: CPT | Performed by: INTERNAL MEDICINE

## 2025-07-02 PROCEDURE — 3023F SPIROM DOC REV: CPT | Performed by: INTERNAL MEDICINE

## 2025-07-02 RX ORDER — ALBUTEROL SULFATE 90 UG/1
2 INHALANT RESPIRATORY (INHALATION) 4 TIMES DAILY PRN
Qty: 18 G | Refills: 5 | Status: SHIPPED | OUTPATIENT
Start: 2025-07-02

## 2025-07-02 RX ORDER — FLUTICASONE FUROATE, UMECLIDINIUM BROMIDE AND VILANTEROL TRIFENATATE 200; 62.5; 25 UG/1; UG/1; UG/1
1 POWDER RESPIRATORY (INHALATION) DAILY
Qty: 60 EACH | Refills: 5 | Status: SHIPPED | OUTPATIENT
Start: 2025-07-02

## 2025-07-02 RX ORDER — FLUTICASONE FUROATE, UMECLIDINIUM BROMIDE AND VILANTEROL TRIFENATATE 200; 62.5; 25 UG/1; UG/1; UG/1
1 POWDER RESPIRATORY (INHALATION) DAILY
Qty: 2 EACH | Refills: 0 | Status: SHIPPED | COMMUNITY
Start: 2025-07-02

## 2025-07-02 NOTE — PROGRESS NOTES
[facility]   PULMONARY AND CRITICAL CARE MEDICINE    OUTPATIENT NOTE     SUBJECTIVE:     CHIEF COMPLAINT / HPI:    Nanette is a 55 y.o. female that initially presented to clinic for evaluation of ACOS.  ET 15-20 ft   She is currently on JUDIT only.  Was on Spiriva until about 1 year ago.   Was admitted to the Hospital in February for COPD exacerbation and possible PNA.   Never tested for MICHAELLE, snores loudly,   JUDIT use is  4 times daily.   Triggers: cleaning products, perfumes, dust, smoke, spring.   Relevant Social History  Tobacco: Current: all pack/day, since age 14, estimated 40+ pack/year exposure history. and Second hand exposure from all her family.  Substances: No significant substance use history reported.   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   Pets: Dog x1  Home: No water damage, mold or infestations  Hobbies: Reading  Family history of pulmonary problems: COPD/Emphysema: mother and brother.     Past Medical History:    Past Medical History:   Diagnosis Date    Anxiety     COPD (chronic obstructive pulmonary disease) (HCC)     Degenerative disk disease     Depression     High blood pressure     MRSA (methicillin resistant staph aureus) culture positive        Social History:    Social History     Tobacco Use   Smoking Status Every Day    Current packs/day: 1.50    Average packs/day: 1.5 packs/day for 35.5 years (53.2 ttl pk-yrs)    Types: Cigarettes    Start date: 1990   Smokeless Tobacco Never   Tobacco Comments    now down to less than 1/2 ppd       Family History:  Family History   Problem Relation Age of Onset    Cancer Father     Sleep Apnea Sister     Sleep Apnea Brother     Sleep Apnea Brother     Sleep Apnea Daughter      Current Medications:  Current Outpatient Medications on File Prior to Visit   Medication Sig Dispense Refill    empagliflozin (JARDIANCE) 10 MG tablet Take 1 tablet by mouth every

## 2025-07-02 NOTE — PATIENT INSTRUCTIONS
PLEASE ASK YOUR PHARMACY OR INSURANCE ABOUT THE COVERAGE ON THE FOLLOWING INHALERS  OPTION 1  ICS/LABA/LAMA  Trelegy Ellipta (fluticasone/vilanterol/umeclidinium 100 mcg/62.5 mcg/25 mcg 200 mcg/62.5 mcg/25 mcg)  Breztri Aerosphere® (budesonide glycopyrrolate formoterol fumarate 160/9/4.8 mcg)     OPTION 2  LAMA  Spiriva® HandiHaler® (tiotropium bromide 18 mcg)  Spiriva® Respimat® (tiotropium bromide 1.25/2.5 mcg)*  Incruse® Ellipta® (umeclidinium 62.5 mcg)  Tudorza™ Pressair™ (aclidinim bromide 400 mcg)  ---PLUS---  ICS/LABA  Breo Ellipta® (Fluticasone, Vilanterol)*  Symbicort® (Budesonide and Formoterol)*  Breyna™ (Budesonide and Formoterol)*  Advair Diskus® (Fluticasone and Salmeterol)*   Advair® HFA (Fluticasone and Salmeterol)*  AirDuo RespiClick® (Fluticasone and Salmeterol)*    Wixela Inhub® (Fluticasone and Salmeterol)*  Dulera® (Mometasone and formoterol)    OPTION 3  ICS  Alvesco® HFA (ciclesonide 80/160 mcg)  ArmonAir™ RespiClick® (fluticasone propionate 55/113/232 mcg)  Arnuity® Ellipta® (fluticasone turoate 100/200 mcg)  Asmanex® HFA (mometasone furoate 100/200 mcg)  Asmanex® Twisthaler (mometasone furoate 110/220 mcg)  Flovent Discus (fluticasone propionate 50/100/250 mcg)  Flovent® HFA (fluticasone propionate 44/110/220 mcg)*  Pulmicort® Flexhaler® (budesonide 90/180 mcg)*  QVAR® Redihaler™ (beclomethasone dipropionate 40/80 mcg)  ---PLUS---  LAMA/LABA  Anoro® Ellipta (umeclidinium and vilanterol 55/22, 62.5/25 mcg )  Stiolto® Respimat® (olodaterol and tiotropium bromide 2.5/2.5 mcg)  Bevespi Aerosphere® (glycopyrrolate and formoterol 9/4.8 mcg)  Duaklir® Pressair® (aclidinium and formoterol 400/12 mcg)    Please call CENTRAL SCHEDULING at [phone] to schedule your Pulmonary function test (PFT)     The [place] [facility]uit [facility] provides personal quit coaching and telephone counseling FREE of charge to ALL [facility], regardless of insurance status or income. It also provides FREE nicotine patches, gum,

## 2025-07-02 NOTE — ASSESSMENT & PLAN NOTE
States her main issue is soda consumption, drinks about 12 cans of regular coca-cola daily (~1600 selena/d)  Recommended to change to coke zero, increase protein intake and decrease carbohydrate intake.   -Discussed dietary recommendations  -Goal weight loss for our next encounter is 268 lb

## 2025-07-02 NOTE — ASSESSMENT & PLAN NOTE
Ongoing, 40+ pack/year  -Extensive discussion (3+ minutes) regarding smoking cessation strategies was held today. 1-800-QUIT-NOW number and State of [place] resources were discussed and will be provided on the AVS.    -Advised patient to quit and offered support.

## 2025-07-02 NOTE — ASSESSMENT & PLAN NOTE
Increased sleepiness, snores loudly and was told she stops breathing. High pre-test probability of MICHAELLE  -Sleep medicine referral placed.

## 2025-07-02 NOTE — ASSESSMENT & PLAN NOTE
Currently only on PRN treatement  -Started Tregley 200 today, samples provided. Side effect profile extensively discussed, recommended to rinse mouth and spit after each use since ICS carry risk of oral thrush and/or periodontal disease.    -Continue JUDIT PRN